# Patient Record
Sex: MALE | Race: WHITE | HISPANIC OR LATINO | URBAN - METROPOLITAN AREA
[De-identification: names, ages, dates, MRNs, and addresses within clinical notes are randomized per-mention and may not be internally consistent; named-entity substitution may affect disease eponyms.]

---

## 2020-01-29 ENCOUNTER — OUTPATIENT (OUTPATIENT)
Dept: OUTPATIENT SERVICES | Facility: HOSPITAL | Age: 65
LOS: 1 days | End: 2020-01-29
Payer: MEDICAID

## 2020-01-29 VITALS
WEIGHT: 190.04 LBS | SYSTOLIC BLOOD PRESSURE: 148 MMHG | RESPIRATION RATE: 18 BRPM | DIASTOLIC BLOOD PRESSURE: 90 MMHG | TEMPERATURE: 97 F | HEART RATE: 95 BPM

## 2020-01-29 DIAGNOSIS — Z98.890 OTHER SPECIFIED POSTPROCEDURAL STATES: Chronic | ICD-10-CM

## 2020-01-29 DIAGNOSIS — K46.9 UNSPECIFIED ABDOMINAL HERNIA WITHOUT OBSTRUCTION OR GANGRENE: ICD-10-CM

## 2020-01-29 DIAGNOSIS — Z01.818 ENCOUNTER FOR OTHER PREPROCEDURAL EXAMINATION: ICD-10-CM

## 2020-01-29 DIAGNOSIS — Z29.9 ENCOUNTER FOR PROPHYLACTIC MEASURES, UNSPECIFIED: ICD-10-CM

## 2020-01-29 DIAGNOSIS — I10 ESSENTIAL (PRIMARY) HYPERTENSION: ICD-10-CM

## 2020-01-29 LAB
ANION GAP SERPL CALC-SCNC: 12 MMOL/L — SIGNIFICANT CHANGE UP (ref 5–17)
BASOPHILS # BLD AUTO: 0.02 K/UL — SIGNIFICANT CHANGE UP (ref 0–0.2)
BASOPHILS NFR BLD AUTO: 0.3 % — SIGNIFICANT CHANGE UP (ref 0–2)
BUN SERPL-MCNC: 13 MG/DL — SIGNIFICANT CHANGE UP (ref 8–20)
CALCIUM SERPL-MCNC: 9.9 MG/DL — SIGNIFICANT CHANGE UP (ref 8.6–10.2)
CHLORIDE SERPL-SCNC: 102 MMOL/L — SIGNIFICANT CHANGE UP (ref 98–107)
CO2 SERPL-SCNC: 24 MMOL/L — SIGNIFICANT CHANGE UP (ref 22–29)
CREAT SERPL-MCNC: 0.87 MG/DL — SIGNIFICANT CHANGE UP (ref 0.5–1.3)
EOSINOPHIL # BLD AUTO: 0.18 K/UL — SIGNIFICANT CHANGE UP (ref 0–0.5)
EOSINOPHIL NFR BLD AUTO: 2.4 % — SIGNIFICANT CHANGE UP (ref 0–6)
GLUCOSE SERPL-MCNC: 135 MG/DL — HIGH (ref 70–99)
HCT VFR BLD CALC: 49.6 % — SIGNIFICANT CHANGE UP (ref 39–50)
HGB BLD-MCNC: 16.6 G/DL — SIGNIFICANT CHANGE UP (ref 13–17)
IMM GRANULOCYTES NFR BLD AUTO: 0.4 % — SIGNIFICANT CHANGE UP (ref 0–1.5)
LYMPHOCYTES # BLD AUTO: 2.4 K/UL — SIGNIFICANT CHANGE UP (ref 1–3.3)
LYMPHOCYTES # BLD AUTO: 32.3 % — SIGNIFICANT CHANGE UP (ref 13–44)
MCHC RBC-ENTMCNC: 29.6 PG — SIGNIFICANT CHANGE UP (ref 27–34)
MCHC RBC-ENTMCNC: 33.5 GM/DL — SIGNIFICANT CHANGE UP (ref 32–36)
MCV RBC AUTO: 88.6 FL — SIGNIFICANT CHANGE UP (ref 80–100)
MONOCYTES # BLD AUTO: 0.85 K/UL — SIGNIFICANT CHANGE UP (ref 0–0.9)
MONOCYTES NFR BLD AUTO: 11.4 % — SIGNIFICANT CHANGE UP (ref 2–14)
MRSA PCR RESULT.: SIGNIFICANT CHANGE UP
NEUTROPHILS # BLD AUTO: 3.96 K/UL — SIGNIFICANT CHANGE UP (ref 1.8–7.4)
NEUTROPHILS NFR BLD AUTO: 53.2 % — SIGNIFICANT CHANGE UP (ref 43–77)
PLATELET # BLD AUTO: 253 K/UL — SIGNIFICANT CHANGE UP (ref 150–400)
POTASSIUM SERPL-MCNC: 4 MMOL/L — SIGNIFICANT CHANGE UP (ref 3.5–5.3)
POTASSIUM SERPL-SCNC: 4 MMOL/L — SIGNIFICANT CHANGE UP (ref 3.5–5.3)
RBC # BLD: 5.6 M/UL — SIGNIFICANT CHANGE UP (ref 4.2–5.8)
RBC # FLD: 12.6 % — SIGNIFICANT CHANGE UP (ref 10.3–14.5)
S AUREUS DNA NOSE QL NAA+PROBE: SIGNIFICANT CHANGE UP
SODIUM SERPL-SCNC: 138 MMOL/L — SIGNIFICANT CHANGE UP (ref 135–145)
WBC # BLD: 7.44 K/UL — SIGNIFICANT CHANGE UP (ref 3.8–10.5)
WBC # FLD AUTO: 7.44 K/UL — SIGNIFICANT CHANGE UP (ref 3.8–10.5)

## 2020-01-29 PROCEDURE — 93005 ELECTROCARDIOGRAM TRACING: CPT

## 2020-01-29 PROCEDURE — 36415 COLL VENOUS BLD VENIPUNCTURE: CPT

## 2020-01-29 PROCEDURE — 85027 COMPLETE CBC AUTOMATED: CPT

## 2020-01-29 PROCEDURE — 80048 BASIC METABOLIC PNL TOTAL CA: CPT

## 2020-01-29 PROCEDURE — 93010 ELECTROCARDIOGRAM REPORT: CPT

## 2020-01-29 PROCEDURE — G0463: CPT

## 2020-01-29 PROCEDURE — 87640 STAPH A DNA AMP PROBE: CPT

## 2020-01-29 PROCEDURE — 87641 MR-STAPH DNA AMP PROBE: CPT

## 2020-01-29 RX ORDER — CEFAZOLIN SODIUM 1 G
2000 VIAL (EA) INJECTION ONCE
Refills: 0 | Status: DISCONTINUED | OUTPATIENT
Start: 2020-02-06 | End: 2020-02-22

## 2020-01-29 NOTE — H&P PST ADULT - ENDOCRINE COMMENTS
----- Message from Naz Kaiser NP sent at 8/17/2017  9:03 AM CDT -----  Please call pt and tell her......  Patient's AFFIRM swab was (+) for BV and (+) for Candida (Yeast Infection).    Please let her know rx was sent for an antibiotic to treat the BV.  Remind her that she cannot drink while taking medication or for 3 days after completion of medication.    I will also sent prescriptions for Diflucan.                   hypothyroid

## 2020-01-29 NOTE — H&P PST ADULT - HISTORY OF PRESENT ILLNESS
64 yr old male presents with a right inguinal hernia that has been there for years with no c/o . Pt had left inguinal hernia repair in 2001 without complications . Over past few months c/o mild discomfort to right groin ,no pain meds , denies pain today . Pt  is now scheduled for surgery  with DR. Jaeger.

## 2020-01-29 NOTE — H&P PST ADULT - ASSESSMENT
Pleasant 64 yr old male in NAD with history of htn and inguinal hernia .  Pt is scheduled for  right inguinal hernia repair with Dr. Jaeger .   OPIOID RISK TOOL    BENJI EACH BOX THAT APPLIES AND ADD TOTALS AT THE END    FAMILY HISTORY OF SUBSTANCE ABUSE                 FEMALE         MALE                                                Alcohol                             [  ]1 pt          [  ]3pts                                               Illegal Durgs                     [  ]2 pts        [  ]3pts                                               Rx Drugs                           [  ]4 pts        [  ]4 pts    PERSONAL HISTORY OF SUBSTANCE ABUSE                                                                                          Alcohol                             [  ]3 pts       [  ]3 pts                                               Illegal Durgs                     [  ]4 pts        [  ]4 pts                                               Rx Drugs                           [  ]5 pts        [  ]5 pts    AGE BETWEEN 16-45 YEARS                                      [  ]1 pt         [  ]1 pt    HISTORY OF PREADOLESCENT   SEXUAL ABUSE                                                             [  ]3 pts        [  ]0pts    PSYCHOLOGICAL DISEASE                     ADD, OCD, Bipolar, Schizophrenia        [  ]2 pts         [  ]2 pts                      Depression                                               [  ]1 pt           [  ]1 pt           SCORING TOTAL   (add numbers and type here)              (*0**)                                     A score of 3 or lower indicated LOW risk for future opiod abuse  A score of 4 to 7 indicated moderate risk for future opiod abuse  A score of 8 or higher indicates a high risk for opiod abuse  CAPRINI VTE 2.0 SCORE [CLOT updated 2019]    AGE RELATED RISK FACTORS                                                       MOBILITY RELATED FACTORS  [ ] Age 41-60 years                                            (1 Point)                    [ ] Bed rest                                                        (1 Point)  [X ] Age: 61-74 years                                           (2 Points)                  [ ] Plaster cast                                                   (2 Points)  [ ] Age= 75 years                                              (3 Points)                    [ ] Bed bound for more than 72 hours                 (2 Points)    DISEASE RELATED RISK FACTORS                                               GENDER SPECIFIC FACTORS  [ ] Edema in the lower extremities                       (1 Point)              [ ] Pregnancy                                                     (1 Point)  [ ] Varicose veins                                               (1 Point)                     [ ] Post-partum < 6 weeks                                   (1 Point)             [ ] BMI > 25 Kg/m2                                            (1 Point)                     [ ] Hormonal therapy  or oral contraception          (1 Point)                 [ ] Sepsis (in the previous month)                        (1 Point)               [ ] History of pregnancy complications                 (1 point)  [ ] Pneumonia or serious lung disease                                               [ ] Unexplained or recurrent                     (1 Point)           (in the previous month)                               (1 Point)  [ ] Abnormal pulmonary function test                     (1 Point)                 SURGERY RELATED RISK FACTORS  [ ] Acute myocardial infarction                              (1 Point)               [ ]  Section                                             (1 Point)  [ ] Congestive heart failure (in the previous month)  (1 Point)      [ ] Minor surgery                                                  (1 Point)   [ ] Inflammatory bowel disease                             (1 Point)               [ ] Arthroscopic surgery                                        (2 Points)  [ ] Central venous access                                      (2 Points)                [ x] General surgery lasting more than 45 minutes (2 points)  [ ] Malignancy- Present or previous                   (2 Points)                [ ] Elective arthroplasty                                         (5 points)    [ ] Stroke (in the previous month)                          (5 Points)                                                                                                                                                           HEMATOLOGY RELATED FACTORS                                                 TRAUMA RELATED RISK FACTORS  [ ] Prior episodes of VTE                                     (3 Points)                [ ] Fracture of the hip, pelvis, or leg                       (5 Points)  [ ] Positive family history for VTE                         (3 Points)             [ ] Acute spinal cord injury (in the previous month)  (5 Points)  [ ] Prothrombin 45978 A                                     (3 Points)               [ ] Paralysis  (less than 1 month)                             (5 Points)  [ ] Factor V Leiden                                             (3 Points)                  [ ] Multiple Trauma within 1 month                        (5 Points)  [ ] Lupus anticoagulants                                     (3 Points)                                                           [ ] Anticardiolipin antibodies                               (3 Points)                                                       [ ] High homocysteine in the blood                      (3 Points)                                             [ ] Other congenital or acquired thrombophilia      (3 Points)                                                [ ] Heparin induced thrombocytopenia                  (3 Points)                                     Total Score [     4     ]

## 2020-01-29 NOTE — ASU PATIENT PROFILE, ADULT - LEARNING ASSESSMENT (PATIENT) ADDITIONAL COMMENTS
NP, instructed pt on pre-op instructions/teaching, tips for safer surgery, pre-surgical infection prevention instructions, pain management scale, MRSA/MSSA instructions given.

## 2020-01-29 NOTE — H&P PST ADULT - NSICDXPROBLEM_GEN_ALL_CORE_FT
PROBLEM DIAGNOSES  Problem: HTN (hypertension)  Assessment and Plan: medical clearance Dr. Marlon Wade     Problem: Hernia  Assessment and Plan: repair of right inguinal hernia with Dr. Jaeger     Problem: Need for prophylactic measure  Assessment and Plan: caprini score 4

## 2020-01-29 NOTE — H&P PST ADULT - NSICDXPASTMEDICALHX_GEN_ALL_CORE_FT
PAST MEDICAL HISTORY:  Hernia right inguinal    High cholesterol     HTN (hypertension)     Hypothyroidism

## 2020-01-29 NOTE — H&P PST ADULT - NSANTHOSAYNRD_GEN_A_CORE
No. ROLAN screening performed.  STOP BANG Legend: 0-2 = LOW Risk; 3-4 = INTERMEDIATE Risk; 5-8 = HIGH Risk

## 2020-02-05 ENCOUNTER — TRANSCRIPTION ENCOUNTER (OUTPATIENT)
Age: 65
End: 2020-02-05

## 2020-02-06 ENCOUNTER — OUTPATIENT (OUTPATIENT)
Dept: OUTPATIENT SERVICES | Facility: HOSPITAL | Age: 65
LOS: 1 days | End: 2020-02-06
Payer: MEDICAID

## 2020-02-06 VITALS
SYSTOLIC BLOOD PRESSURE: 154 MMHG | TEMPERATURE: 99 F | HEART RATE: 84 BPM | HEIGHT: 68.5 IN | OXYGEN SATURATION: 100 % | WEIGHT: 190.04 LBS | RESPIRATION RATE: 16 BRPM | DIASTOLIC BLOOD PRESSURE: 78 MMHG

## 2020-02-06 VITALS
RESPIRATION RATE: 15 BRPM | OXYGEN SATURATION: 100 % | DIASTOLIC BLOOD PRESSURE: 89 MMHG | SYSTOLIC BLOOD PRESSURE: 138 MMHG | HEART RATE: 63 BPM

## 2020-02-06 DIAGNOSIS — K40.90 UNILATERAL INGUINAL HERNIA, WITHOUT OBSTRUCTION OR GANGRENE, NOT SPECIFIED AS RECURRENT: ICD-10-CM

## 2020-02-06 DIAGNOSIS — Z98.890 OTHER SPECIFIED POSTPROCEDURAL STATES: Chronic | ICD-10-CM

## 2020-02-06 PROCEDURE — 88302 TISSUE EXAM BY PATHOLOGIST: CPT

## 2020-02-06 PROCEDURE — C1781: CPT

## 2020-02-06 PROCEDURE — 88302 TISSUE EXAM BY PATHOLOGIST: CPT | Mod: 26

## 2020-02-06 PROCEDURE — 49505 PRP I/HERN INIT REDUC >5 YR: CPT | Mod: RT

## 2020-02-06 RX ORDER — DEXAMETHASONE 0.5 MG/5ML
8 ELIXIR ORAL ONCE
Refills: 0 | Status: DISCONTINUED | OUTPATIENT
Start: 2020-02-06 | End: 2020-02-06

## 2020-02-06 RX ORDER — SIMVASTATIN 20 MG/1
1 TABLET, FILM COATED ORAL
Qty: 0 | Refills: 0 | DISCHARGE

## 2020-02-06 RX ORDER — LEVOTHYROXINE SODIUM 125 MCG
1 TABLET ORAL
Qty: 0 | Refills: 0 | DISCHARGE

## 2020-02-06 RX ORDER — ONDANSETRON 8 MG/1
4 TABLET, FILM COATED ORAL ONCE
Refills: 0 | Status: COMPLETED | OUTPATIENT
Start: 2020-02-06 | End: 2020-02-06

## 2020-02-06 RX ORDER — FENTANYL CITRATE 50 UG/ML
25 INJECTION INTRAVENOUS
Refills: 0 | Status: DISCONTINUED | OUTPATIENT
Start: 2020-02-06 | End: 2020-02-06

## 2020-02-06 RX ORDER — HYDROMORPHONE HYDROCHLORIDE 2 MG/ML
1 INJECTION INTRAMUSCULAR; INTRAVENOUS; SUBCUTANEOUS
Refills: 0 | Status: DISCONTINUED | OUTPATIENT
Start: 2020-02-06 | End: 2020-02-06

## 2020-02-06 RX ORDER — OMEGA-3 ACID ETHYL ESTERS 1 G
1 CAPSULE ORAL
Qty: 0 | Refills: 0 | DISCHARGE

## 2020-02-06 RX ORDER — LISINOPRIL 2.5 MG/1
1 TABLET ORAL
Qty: 0 | Refills: 0 | DISCHARGE

## 2020-02-06 RX ORDER — VITAMIN E 100 UNIT
1 CAPSULE ORAL
Qty: 0 | Refills: 0 | DISCHARGE

## 2020-02-06 RX ORDER — BUPIVACAINE 13.3 MG/ML
20 INJECTION, SUSPENSION, LIPOSOMAL INFILTRATION ONCE
Refills: 0 | Status: DISCONTINUED | OUTPATIENT
Start: 2020-02-06 | End: 2020-02-06

## 2020-02-06 RX ORDER — SODIUM CHLORIDE 9 MG/ML
1000 INJECTION, SOLUTION INTRAVENOUS
Refills: 0 | Status: DISCONTINUED | OUTPATIENT
Start: 2020-02-06 | End: 2020-02-06

## 2020-02-06 RX ADMIN — ONDANSETRON 4 MILLIGRAM(S): 8 TABLET, FILM COATED ORAL at 12:30

## 2020-02-06 RX ADMIN — HYDROMORPHONE HYDROCHLORIDE 1 MILLIGRAM(S): 2 INJECTION INTRAMUSCULAR; INTRAVENOUS; SUBCUTANEOUS at 12:30

## 2020-02-06 NOTE — BRIEF OPERATIVE NOTE - NSICDXBRIEFPROCEDURE_GEN_ALL_CORE_FT
PROCEDURES:  Repair, hernia, inguinal, open, adult 06-Feb-2020 11:21:50 R Direct inguinal hernia repair with [facility] mesh Nader Henderson

## 2020-02-06 NOTE — ASU DISCHARGE PLAN (ADULT/PEDIATRIC) - CARE PROVIDER_API CALL
Alpesh Jaeger)  Surgery  [address] [place] [address]  Phone: [phone]  Fax: [phone]  Established Patient  Follow Up Time: 2 weeks

## 2020-02-06 NOTE — ASU DISCHARGE PLAN (ADULT/PEDIATRIC) - CALL YOUR DOCTOR IF YOU HAVE ANY OF THE FOLLOWING:
Inability to tolerate liquids or foods/Swelling that gets worse/Fever greater than (need to indicate Fahrenheit or Celsius)/Wound/Surgical Site with redness, or foul smelling discharge or pus/Pain not relieved by Medications/Bleeding that does not stop

## 2020-02-06 NOTE — BRIEF OPERATIVE NOTE - OPERATION/FINDINGS
Right direct inguinal hernia repair with UHS mesh and excision of lipoma from inguinal canal.  Wound class 1

## 2020-02-06 NOTE — ASU DISCHARGE PLAN (ADULT/PEDIATRIC) - ASU DC SPECIAL INSTRUCTIONSFT
- Please follow up in Clinic with Dr. Jaeger in 2 weeks  - Okay to resume regular diet  - Okay to shower normally with soap and water. Do not soak incisions  - For pain, please take tylenol and motrin as instructed on packaging. If Pain does not subside with tylenol and motrin, please take percocet 5/325 as prescribed for severe pain  - Please use Ice packs as needed for pain  - No heavy lifting >15-20lbs for 4-6 weeks

## 2020-02-13 LAB — SURGICAL PATHOLOGY STUDY: SIGNIFICANT CHANGE UP

## 2022-12-09 ENCOUNTER — RX ONLY (RX ONLY)
Age: 67
End: 2022-12-09

## 2022-12-09 ENCOUNTER — OFFICE (OUTPATIENT)
Dept: URBAN - METROPOLITAN AREA CLINIC 94 | Facility: CLINIC | Age: 67
Setting detail: OPHTHALMOLOGY
End: 2022-12-09
Payer: COMMERCIAL

## 2022-12-09 DIAGNOSIS — Z96.1: ICD-10-CM

## 2022-12-09 DIAGNOSIS — H35.373: ICD-10-CM

## 2022-12-09 DIAGNOSIS — H16.223: ICD-10-CM

## 2022-12-09 DIAGNOSIS — H26.492: ICD-10-CM

## 2022-12-09 PROCEDURE — 99213 OFFICE O/P EST LOW 20 MIN: CPT | Performed by: PHYSICIAN ASSISTANT

## 2022-12-09 ASSESSMENT — REFRACTION_MANIFEST
OS_SPHERE: +1.00
OS_VA2: 20/20
OD_VA1: 20/20
OS_AXIS: 115
OD_ADD: +1.50
OD_VA2: 20/20
OS_SPHERE: +0.25
OS_VA2: 20/30+2
OD_AXIS: 060
OD_AXIS: 075
OD_SPHERE: -0.25
OD_CYLINDER: -0.50
OD_SPHERE: +2.25
OS_CYLINDER: SPH
OD_ADD: +2.50
OS_SPHERE: +0.25
OS_ADD: +2.00
OD_CYLINDER: -1.00
OS_CYLINDER: -0.25
OS_ADD: +2.25
OS_VA1: 20/20
OD_AXIS: 070
OD_VA1: 20/40+2
OD_VA2: 20/20
OD_SPHERE: PLANO
OD_VA1: 20/30
OD_CYLINDER: -0.50
OS_VA1: 20/20
OD_CYLINDER: -0.75
OS_CYLINDER: -0.25
OS_AXIS: 120
OD_VA1: 20/20
OS_ADD: +2.50
OD_SPHERE: PLANO
OS_VA1: 20/30
OD_AXIS: 175
OD_ADD: +2.25

## 2022-12-09 ASSESSMENT — REFRACTION_CURRENTRX
OS_AXIS: 154
OD_ADD: +2.25
OS_SPHERE: +1.25
OD_AXIS: 050
OD_OVR_VA: 20/
OS_OVR_VA: 20/
OS_OVR_VA: 20/
OD_OVR_VA: 20/
OD_VPRISM_DIRECTION: BF
OD_VPRISM_DIRECTION: PROGS
OD_CYLINDER: -1.00
OD_CYLINDER: -1.00
OD_ADD: +2.50
OS_ADD: +2.00
OS_SPHERE: +0.75
OD_OVR_VA: 20/
OS_ADD: +2.25
OD_VPRISM_DIRECTION: PROGS
OS_OVR_VA: 20/
OS_VPRISM_DIRECTION: PROGS
OD_SPHERE: +2.75
OS_VPRISM_DIRECTION: PROGS
OS_AXIS: 166
OD_SPHERE: +0.75
OS_CYLINDER: -0.50
OS_VPRISM_DIRECTION: BF
OS_SPHERE: +1.50
OD_AXIS: 047
OS_CYLINDER: -0.25
OS_ADD: +2.50
OD_SPHERE: +2.00
OD_ADD: +2.00

## 2022-12-09 ASSESSMENT — SUPERFICIAL PUNCTATE KERATITIS (SPK)
OS_SPK: T
OD_SPK: T

## 2022-12-09 ASSESSMENT — SPHEQUIV_DERIVED
OD_SPHEQUIV: -0.5
OS_SPHEQUIV: 0.125
OD_SPHEQUIV: 2
OS_SPHEQUIV: 0.25
OS_SPHEQUIV: 0.125
OD_SPHEQUIV: -0.25

## 2022-12-09 ASSESSMENT — KERATOMETRY
OS_AXISANGLE_DEGREES: 070
OS_K2POWER_DIOPTERS: 43.00
METHOD_AUTO_MANUAL: AUTO
OS_K1POWER_DIOPTERS: 42.50
OD_K2POWER_DIOPTERS: 43.00
OD_K1POWER_DIOPTERS: 42.50
OD_AXISANGLE_DEGREES: 147

## 2022-12-09 ASSESSMENT — LID POSITION - PTOSIS
OS_PTOSIS: LUL T
OD_PTOSIS: RUL T

## 2022-12-09 ASSESSMENT — TONOMETRY
OS_IOP_MMHG: 16
OD_IOP_MMHG: 15

## 2022-12-09 ASSESSMENT — AXIALLENGTH_DERIVED
OD_AL: 23.9708
OD_AL: 23.0987
OS_AL: 23.7713
OS_AL: 23.8209
OS_AL: 23.8209
OD_AL: 24.0718

## 2022-12-09 ASSESSMENT — REFRACTION_AUTOREFRACTION
OD_CYLINDER: -1.00
OS_SPHERE: +0.50
OS_CYLINDER: -0.50
OS_AXIS: 116
OD_AXIS: 074
OD_SPHERE: +0.25

## 2022-12-09 ASSESSMENT — CONFRONTATIONAL VISUAL FIELD TEST (CVF)
OD_FINDINGS: FULL
OS_FINDINGS: FULL

## 2022-12-09 ASSESSMENT — TEAR BREAK UP TIME (TBUT)
OS_TBUT: T 1+
OD_TBUT: T 1+

## 2022-12-09 ASSESSMENT — VISUAL ACUITY
OS_BCVA: 20/30
OD_BCVA: 20/20

## 2023-01-13 ENCOUNTER — OFFICE (OUTPATIENT)
Dept: URBAN - METROPOLITAN AREA CLINIC 94 | Facility: CLINIC | Age: 68
Setting detail: OPHTHALMOLOGY
End: 2023-01-13
Payer: COMMERCIAL

## 2023-01-13 DIAGNOSIS — H35.373: ICD-10-CM

## 2023-01-13 DIAGNOSIS — H16.223: ICD-10-CM

## 2023-01-13 DIAGNOSIS — H26.492: ICD-10-CM

## 2023-01-13 PROCEDURE — 92012 INTRM OPH EXAM EST PATIENT: CPT | Performed by: OPHTHALMOLOGY

## 2023-01-13 ASSESSMENT — REFRACTION_MANIFEST
OS_ADD: +2.50
OD_AXIS: 175
OD_ADD: +2.25
OS_CYLINDER: -0.25
OD_SPHERE: PLANO
OD_CYLINDER: -0.50
OD_VA1: 20/30
OD_VA1: 20/20
OD_ADD: +2.50
OD_SPHERE: PLANO
OD_AXIS: 070
OS_SPHERE: +0.25
OD_CYLINDER: -0.75
OS_CYLINDER: -0.25
OD_SPHERE: -0.25
OS_SPHERE: +0.25
OD_VA2: 20/20
OD_AXIS: 075
OS_SPHERE: +1.00
OS_VA1: 20/20
OD_VA1: 20/20
OS_VA1: 20/30
OD_ADD: +1.50
OS_VA2: 20/30+2
OD_VA1: 20/40+2
OS_VA2: 20/20
OD_CYLINDER: -1.00
OS_AXIS: 120
OS_CYLINDER: SPH
OS_VA1: 20/20
OS_ADD: +2.25
OD_VA2: 20/20
OD_SPHERE: +2.25
OS_AXIS: 115
OS_ADD: +2.00
OD_AXIS: 060
OD_CYLINDER: -0.50

## 2023-01-13 ASSESSMENT — REFRACTION_CURRENTRX
OS_OVR_VA: 20/
OD_ADD: +2.25
OS_VPRISM_DIRECTION: BF
OS_OVR_VA: 20/
OD_OVR_VA: 20/
OS_ADD: +2.50
OD_SPHERE: +2.75
OS_AXIS: 154
OS_CYLINDER: -0.50
OD_ADD: +2.00
OS_SPHERE: +1.50
OD_AXIS: 050
OS_ADD: +2.25
OS_CYLINDER: -0.25
OD_CYLINDER: -1.00
OD_OVR_VA: 20/
OS_VPRISM_DIRECTION: PROGS
OS_SPHERE: +0.75
OD_VPRISM_DIRECTION: PROGS
OS_ADD: +2.00
OS_AXIS: 166
OD_CYLINDER: -1.00
OD_VPRISM_DIRECTION: BF
OD_OVR_VA: 20/
OD_SPHERE: +2.00
OS_VPRISM_DIRECTION: PROGS
OS_SPHERE: +1.25
OS_OVR_VA: 20/
OD_VPRISM_DIRECTION: PROGS
OD_AXIS: 047
OD_ADD: +2.50
OD_SPHERE: +0.75

## 2023-01-13 ASSESSMENT — AXIALLENGTH_DERIVED
OS_AL: 23.8706
OS_AL: 23.8209
OD_AL: 23.0548
OS_AL: 23.8209
OD_AL: 23.8735
OD_AL: 24.024

## 2023-01-13 ASSESSMENT — KERATOMETRY
METHOD_AUTO_MANUAL: AUTO
OD_K1POWER_DIOPTERS: 42.50
OD_K2POWER_DIOPTERS: 43.25
OS_K2POWER_DIOPTERS: 43.00
OS_K1POWER_DIOPTERS: 42.50
OD_AXISANGLE_DEGREES: 156
OS_AXISANGLE_DEGREES: 054

## 2023-01-13 ASSESSMENT — LID POSITION - PTOSIS
OS_PTOSIS: LUL T
OD_PTOSIS: RUL T

## 2023-01-13 ASSESSMENT — SPHEQUIV_DERIVED
OS_SPHEQUIV: 0
OD_SPHEQUIV: -0.5
OS_SPHEQUIV: 0.125
OD_SPHEQUIV: -0.125
OS_SPHEQUIV: 0.125
OD_SPHEQUIV: 2

## 2023-01-13 ASSESSMENT — VISUAL ACUITY
OD_BCVA: 20/30+
OS_BCVA: 20/30-

## 2023-01-13 ASSESSMENT — CONFRONTATIONAL VISUAL FIELD TEST (CVF)
OD_FINDINGS: FULL
OS_FINDINGS: FULL

## 2023-01-13 ASSESSMENT — TEAR BREAK UP TIME (TBUT)
OD_TBUT: T 1+
OS_TBUT: T 1+

## 2023-01-13 ASSESSMENT — TONOMETRY
OS_IOP_MMHG: 16
OD_IOP_MMHG: 18

## 2023-01-13 ASSESSMENT — REFRACTION_AUTOREFRACTION
OS_AXIS: 107
OD_CYLINDER: -1.25
OS_SPHERE: +0.25
OS_CYLINDER: -0.50
OD_AXIS: 080
OD_SPHERE: +0.50

## 2023-01-13 ASSESSMENT — SUPERFICIAL PUNCTATE KERATITIS (SPK)
OD_SPK: T
OS_SPK: T

## 2023-01-31 PROBLEM — I10 ESSENTIAL (PRIMARY) HYPERTENSION: Chronic | Status: ACTIVE | Noted: 2020-01-29

## 2023-01-31 PROBLEM — E78.00 PURE HYPERCHOLESTEROLEMIA, UNSPECIFIED: Chronic | Status: ACTIVE | Noted: 2020-01-29

## 2023-01-31 PROBLEM — E03.9 HYPOTHYROIDISM, UNSPECIFIED: Chronic | Status: ACTIVE | Noted: 2020-01-29

## 2023-01-31 PROBLEM — K46.9 UNSPECIFIED ABDOMINAL HERNIA WITHOUT OBSTRUCTION OR GANGRENE: Chronic | Status: ACTIVE | Noted: 2020-01-29

## 2023-02-17 ENCOUNTER — ASC (OUTPATIENT)
Dept: URBAN - METROPOLITAN AREA SURGERY 8 | Facility: SURGERY | Age: 68
Setting detail: OPHTHALMOLOGY
End: 2023-02-17
Payer: COMMERCIAL

## 2023-02-17 DIAGNOSIS — H26.492: ICD-10-CM

## 2023-02-17 PROCEDURE — 66821 AFTER CATARACT LASER SURGERY: CPT | Performed by: OPHTHALMOLOGY

## 2023-02-17 ASSESSMENT — REFRACTION_MANIFEST
OD_CYLINDER: -1.00
OD_AXIS: 075
OD_VA1: 20/20
OD_SPHERE: PLANO
OD_SPHERE: PLANO
OS_SPHERE: +0.25
OD_ADD: +2.50
OD_SPHERE: -0.25
OD_CYLINDER: -0.75
OS_CYLINDER: -0.25
OD_ADD: +1.50
OD_VA1: 20/20
OS_VA1: 20/20
OS_ADD: +2.00
OD_VA1: 20/30
OD_ADD: +2.25
OS_CYLINDER: -0.25
OD_AXIS: 060
OD_CYLINDER: -0.50
OD_VA2: 20/20
OS_ADD: +2.25
OD_CYLINDER: -0.50
OD_AXIS: 070
OD_AXIS: 175
OS_AXIS: 120
OS_VA2: 20/30+2
OD_SPHERE: +2.25
OS_AXIS: 115
OS_VA1: 20/30
OS_VA1: 20/20
OS_SPHERE: +1.00
OD_VA2: 20/20
OS_VA2: 20/20
OS_SPHERE: +0.25
OD_VA1: 20/40+2
OS_CYLINDER: SPH
OS_ADD: +2.50

## 2023-02-17 ASSESSMENT — REFRACTION_CURRENTRX
OS_SPHERE: +1.50
OS_OVR_VA: 20/
OS_CYLINDER: -0.50
OS_AXIS: 166
OS_ADD: +2.50
OD_OVR_VA: 20/
OD_VPRISM_DIRECTION: PROGS
OS_AXIS: 154
OD_AXIS: 047
OD_VPRISM_DIRECTION: BF
OS_ADD: +2.00
OD_OVR_VA: 20/
OD_AXIS: 050
OS_VPRISM_DIRECTION: PROGS
OS_ADD: +2.25
OS_VPRISM_DIRECTION: PROGS
OD_CYLINDER: -1.00
OS_VPRISM_DIRECTION: BF
OS_OVR_VA: 20/
OD_ADD: +2.25
OS_SPHERE: +1.25
OD_CYLINDER: -1.00
OS_OVR_VA: 20/
OD_ADD: +2.50
OS_SPHERE: +0.75
OD_SPHERE: +2.75
OD_OVR_VA: 20/
OD_VPRISM_DIRECTION: PROGS
OD_SPHERE: +0.75
OD_SPHERE: +2.00
OS_CYLINDER: -0.25
OD_ADD: +2.00

## 2023-02-17 ASSESSMENT — REFRACTION_AUTOREFRACTION
OD_CYLINDER: -1.25
OS_AXIS: 107
OS_CYLINDER: -0.50
OS_SPHERE: +0.25
OD_AXIS: 080
OD_SPHERE: +0.50

## 2023-02-17 ASSESSMENT — KERATOMETRY
OS_K1POWER_DIOPTERS: 42.50
OD_AXISANGLE_DEGREES: 156
OS_K2POWER_DIOPTERS: 43.00
OD_K1POWER_DIOPTERS: 42.50
METHOD_AUTO_MANUAL: AUTO
OS_AXISANGLE_DEGREES: 054
OD_K2POWER_DIOPTERS: 43.25

## 2023-02-17 ASSESSMENT — VISUAL ACUITY
OD_BCVA: 20/25
OS_BCVA: 20/30-1

## 2023-02-17 ASSESSMENT — AXIALLENGTH_DERIVED
OD_AL: 23.0548
OS_AL: 23.8209
OD_AL: 24.024
OS_AL: 23.8209
OD_AL: 23.8735
OS_AL: 23.8706

## 2023-02-17 ASSESSMENT — SPHEQUIV_DERIVED
OD_SPHEQUIV: -0.5
OS_SPHEQUIV: 0.125
OS_SPHEQUIV: 0
OS_SPHEQUIV: 0.125
OD_SPHEQUIV: -0.125
OD_SPHEQUIV: 2

## 2023-02-17 ASSESSMENT — TONOMETRY
OS_IOP_MMHG: 16
OD_IOP_MMHG: 12

## 2023-03-05 ENCOUNTER — OFFICE (OUTPATIENT)
Dept: URBAN - METROPOLITAN AREA CLINIC 94 | Facility: CLINIC | Age: 68
Setting detail: OPHTHALMOLOGY
End: 2023-03-05
Payer: COMMERCIAL

## 2023-03-05 DIAGNOSIS — H26.492: ICD-10-CM

## 2023-03-05 PROCEDURE — 99024 POSTOP FOLLOW-UP VISIT: CPT | Performed by: REGISTERED NURSE

## 2023-03-05 ASSESSMENT — REFRACTION_MANIFEST
OS_CYLINDER: -0.25
OD_CYLINDER: -1.00
OD_ADD: +2.50
OD_SPHERE: PLANO
OD_VA1: 20/20
OS_VA1: 20/30
OD_VA2: 20/20
OS_ADD: +2.50
OS_CYLINDER: -0.25
OD_SPHERE: PLANO
OS_SPHERE: +0.25
OD_AXIS: 060
OS_ADD: +2.25
OS_VA1: 20/20
OD_VA1: 20/40+2
OD_CYLINDER: -0.50
OD_ADD: +2.25
OD_AXIS: 175
OD_SPHERE: PLANO
OD_VA1: 20/20
OD_CYLINDER: -0.75
OD_CYLINDER: -0.50
OS_SPHERE: +0.25
OD_VA2: 20/20
OD_VA1: 20/25
OD_AXIS: 080
OS_AXIS: 115
OS_VA1: 20/20
OS_AXIS: 120
OS_VA2: 20/20
OD_CYLINDER: -1.00
OS_VA2: 20/30+2
OS_ADD: +2.00
OD_SPHERE: -0.25
OD_SPHERE: +2.25
OD_AXIS: 070
OD_VA1: 20/30
OS_CYLINDER: SPH
OD_AXIS: 075
OD_ADD: +1.50
OS_SPHERE: +1.00

## 2023-03-05 ASSESSMENT — REFRACTION_CURRENTRX
OS_OVR_VA: 20/
OD_CYLINDER: -1.00
OS_VPRISM_DIRECTION: PROGS
OD_ADD: +2.50
OS_ADD: +2.00
OD_SPHERE: +2.75
OD_VPRISM_DIRECTION: BF
OS_SPHERE: +1.25
OS_VPRISM_DIRECTION: BF
OD_ADD: +2.25
OD_CYLINDER: -1.00
OS_SPHERE: +0.75
OS_AXIS: 154
OD_SPHERE: +2.00
OS_OVR_VA: 20/
OS_CYLINDER: -0.50
OD_OVR_VA: 20/
OS_AXIS: 166
OD_AXIS: 047
OS_ADD: +2.25
OD_AXIS: 050
OS_OVR_VA: 20/
OD_VPRISM_DIRECTION: PROGS
OD_SPHERE: +0.75
OD_OVR_VA: 20/
OS_VPRISM_DIRECTION: PROGS
OS_CYLINDER: -0.25
OS_ADD: +2.50
OD_ADD: +2.00
OD_VPRISM_DIRECTION: PROGS
OS_SPHERE: +1.50
OD_OVR_VA: 20/

## 2023-03-05 ASSESSMENT — AXIALLENGTH_DERIVED
OS_AL: 23.8237
OD_AL: 23.0548
OD_AL: 23.9235
OS_AL: 23.7742
OD_AL: 24.024
OS_AL: 23.7742

## 2023-03-05 ASSESSMENT — VISUAL ACUITY
OD_BCVA: 20/20-2
OS_BCVA: 20/40

## 2023-03-05 ASSESSMENT — TONOMETRY
OD_IOP_MMHG: 16
OS_IOP_MMHG: 15

## 2023-03-05 ASSESSMENT — REFRACTION_AUTOREFRACTION
OS_SPHERE: +0.25
OD_AXIS: 077
OS_CYLINDER: -0.50
OD_SPHERE: +0.25
OD_CYLINDER: -1.00
OS_AXIS: 110

## 2023-03-05 ASSESSMENT — KERATOMETRY
OD_K2POWER_DIOPTERS: 43.00
METHOD_AUTO_MANUAL: AUTO
OD_AXISANGLE_DEGREES: 135
OS_AXISANGLE_DEGREES: 056
OD_K1POWER_DIOPTERS: 42.75
OS_K1POWER_DIOPTERS: 42.75
OS_K2POWER_DIOPTERS: 43.00

## 2023-03-05 ASSESSMENT — TEAR BREAK UP TIME (TBUT)
OD_TBUT: T 1+
OS_TBUT: T 1+

## 2023-03-05 ASSESSMENT — SPHEQUIV_DERIVED
OD_SPHEQUIV: 2
OS_SPHEQUIV: 0.125
OS_SPHEQUIV: 0.125
OS_SPHEQUIV: 0
OD_SPHEQUIV: -0.25
OD_SPHEQUIV: -0.5

## 2023-03-05 ASSESSMENT — CONFRONTATIONAL VISUAL FIELD TEST (CVF)
OS_FINDINGS: FULL
OD_FINDINGS: FULL

## 2023-03-05 ASSESSMENT — SUPERFICIAL PUNCTATE KERATITIS (SPK)
OD_SPK: T
OS_SPK: T

## 2023-03-05 ASSESSMENT — LID POSITION - PTOSIS
OD_PTOSIS: RUL T
OS_PTOSIS: LUL T

## 2023-03-17 ENCOUNTER — OFFICE (OUTPATIENT)
Dept: URBAN - METROPOLITAN AREA CLINIC 94 | Facility: CLINIC | Age: 68
Setting detail: OPHTHALMOLOGY
End: 2023-03-17
Payer: COMMERCIAL

## 2023-03-17 DIAGNOSIS — H26.493: ICD-10-CM

## 2023-03-17 DIAGNOSIS — H43.393: ICD-10-CM

## 2023-03-17 DIAGNOSIS — Z96.1: ICD-10-CM

## 2023-03-17 PROCEDURE — 99024 POSTOP FOLLOW-UP VISIT: CPT | Performed by: OPHTHALMOLOGY

## 2023-03-17 ASSESSMENT — SUPERFICIAL PUNCTATE KERATITIS (SPK)
OS_SPK: T
OD_SPK: T

## 2023-03-17 ASSESSMENT — REFRACTION_CURRENTRX
OD_CYLINDER: -1.00
OD_SPHERE: +2.00
OS_CYLINDER: -0.25
OS_ADD: +2.00
OD_ADD: +2.00
OD_SPHERE: +0.75
OS_ADD: +2.25
OD_CYLINDER: -1.00
OS_CYLINDER: -0.50
OD_AXIS: 047
OD_OVR_VA: 20/
OD_AXIS: 050
OS_AXIS: 154
OS_VPRISM_DIRECTION: PROGS
OS_OVR_VA: 20/
OD_ADD: +2.50
OD_VPRISM_DIRECTION: BF
OS_SPHERE: +1.50
OS_ADD: +2.50
OD_ADD: +2.25
OS_VPRISM_DIRECTION: BF
OS_OVR_VA: 20/
OD_SPHERE: +2.75
OS_AXIS: 166
OD_OVR_VA: 20/
OS_SPHERE: +0.75
OD_VPRISM_DIRECTION: PROGS
OS_OVR_VA: 20/
OS_SPHERE: +1.25
OS_VPRISM_DIRECTION: PROGS
OD_VPRISM_DIRECTION: PROGS
OD_OVR_VA: 20/

## 2023-03-17 ASSESSMENT — REFRACTION_MANIFEST
OD_VA1: 20/20
OS_VA2: 20/20
OS_ADD: +2.00
OD_VA1: 20/20
OD_CYLINDER: -0.75
OS_AXIS: 120
OD_AXIS: 080
OD_SPHERE: +2.25
OD_ADD: +2.25
OS_CYLINDER: -0.25
OD_SPHERE: PLANO
OD_AXIS: 075
OS_ADD: +2.25
OD_VA1: 20/30
OS_SPHERE: +0.25
OD_ADD: +2.50
OS_AXIS: 120
OS_ADD: +2.50
OS_SPHERE: +0.25
OS_VA1: 20/20
OD_SPHERE: -0.25
OS_VA1: 20/20-2
OD_VA2: 20/20
OS_CYLINDER: SPH
OD_SPHERE: +0.50
OD_CYLINDER: -1.00
OD_VA1: 20/25
OS_SPHERE: +1.00
OD_CYLINDER: -0.50
OS_CYLINDER: -0.25
OD_SPHERE: PLANO
OD_CYLINDER: -1.00
OD_AXIS: 080
OS_VA2: 20/30+2
OD_VA2: 20/20
OD_CYLINDER: -0.50
OS_VA1: 20/30
OD_VA1: 20/30-2
OD_ADD: +1.50
OD_AXIS: 060
OD_AXIS: 175

## 2023-03-17 ASSESSMENT — AXIALLENGTH_DERIVED
OD_AL: 23.9206
OD_AL: 23.8706
OD_AL: 23.0987
OS_AL: 23.8209
OD_AL: 24.0718
OS_AL: 23.8706
OS_AL: 23.8209

## 2023-03-17 ASSESSMENT — KERATOMETRY
OS_K2POWER_DIOPTERS: 43.00
OD_K2POWER_DIOPTERS: 43.00
OD_AXISANGLE_DEGREES: 154
OD_K1POWER_DIOPTERS: 42.50
OS_AXISANGLE_DEGREES: 065
OS_K1POWER_DIOPTERS: 42.50
METHOD_AUTO_MANUAL: AUTO

## 2023-03-17 ASSESSMENT — LID POSITION - PTOSIS
OS_PTOSIS: LUL T
OD_PTOSIS: RUL T

## 2023-03-17 ASSESSMENT — TEAR BREAK UP TIME (TBUT)
OS_TBUT: T 1+
OD_TBUT: T 1+

## 2023-03-17 ASSESSMENT — CONFRONTATIONAL VISUAL FIELD TEST (CVF)
OD_FINDINGS: FULL
OS_FINDINGS: FULL

## 2023-03-17 ASSESSMENT — SPHEQUIV_DERIVED
OD_SPHEQUIV: -0.5
OS_SPHEQUIV: 0
OS_SPHEQUIV: 0.125
OD_SPHEQUIV: 2
OD_SPHEQUIV: 0
OD_SPHEQUIV: -0.125
OS_SPHEQUIV: 0.125

## 2023-03-17 ASSESSMENT — REFRACTION_AUTOREFRACTION
OS_SPHERE: +0.25
OD_CYLINDER: -1.25
OS_CYLINDER: -0.50
OS_AXIS: 117
OD_AXIS: 079
OD_SPHERE: +0.50

## 2023-03-17 ASSESSMENT — TONOMETRY
OS_IOP_MMHG: 14
OD_IOP_MMHG: 14

## 2023-03-17 ASSESSMENT — VISUAL ACUITY
OD_BCVA: 20/25+1
OS_BCVA: 20/40-1

## 2023-03-22 ENCOUNTER — OFFICE (OUTPATIENT)
Dept: URBAN - METROPOLITAN AREA CLINIC 94 | Facility: CLINIC | Age: 68
Setting detail: OPHTHALMOLOGY
End: 2023-03-22
Payer: COMMERCIAL

## 2023-03-22 DIAGNOSIS — H35.033: ICD-10-CM

## 2023-03-22 DIAGNOSIS — H35.373: ICD-10-CM

## 2023-03-22 DIAGNOSIS — H43.393: ICD-10-CM

## 2023-03-22 PROBLEM — H26.493 POSTERIOR CAPSULAR OPACIFICATION; BOTH EYES: Status: ACTIVE | Noted: 2023-03-17

## 2023-03-22 PROCEDURE — 92012 INTRM OPH EXAM EST PATIENT: CPT | Performed by: OPHTHALMOLOGY

## 2023-03-22 PROCEDURE — 92134 CPTRZ OPH DX IMG PST SGM RTA: CPT | Performed by: OPHTHALMOLOGY

## 2023-03-22 PROCEDURE — 92235 FLUORESCEIN ANGRPH MLTIFRAME: CPT | Performed by: OPHTHALMOLOGY

## 2023-03-22 ASSESSMENT — KERATOMETRY
OD_K2POWER_DIOPTERS: 43.00
OS_AXISANGLE_DEGREES: 065
METHOD_AUTO_MANUAL: AUTO
OS_K1POWER_DIOPTERS: 42.50
OD_K1POWER_DIOPTERS: 42.50
OS_K2POWER_DIOPTERS: 43.00
OD_AXISANGLE_DEGREES: 154

## 2023-03-22 ASSESSMENT — CONFRONTATIONAL VISUAL FIELD TEST (CVF)
OD_FINDINGS: FULL
OS_FINDINGS: FULL

## 2023-03-22 ASSESSMENT — REFRACTION_AUTOREFRACTION
OS_CYLINDER: -0.50
OS_SPHERE: +0.25
OD_CYLINDER: -1.25
OD_AXIS: 079
OD_SPHERE: +0.50
OS_AXIS: 117

## 2023-03-22 ASSESSMENT — AXIALLENGTH_DERIVED
OS_AL: 23.8706
OD_AL: 23.9206

## 2023-03-22 ASSESSMENT — SUPERFICIAL PUNCTATE KERATITIS (SPK)
OD_SPK: T
OS_SPK: T

## 2023-03-22 ASSESSMENT — TONOMETRY
OD_IOP_MMHG: 15
OS_IOP_MMHG: 15

## 2023-03-22 ASSESSMENT — LID POSITION - PTOSIS
OD_PTOSIS: RUL T
OS_PTOSIS: LUL T

## 2023-03-22 ASSESSMENT — TEAR BREAK UP TIME (TBUT)
OS_TBUT: T 1+
OD_TBUT: T 1+

## 2023-03-22 ASSESSMENT — VISUAL ACUITY
OD_BCVA: 20/20
OS_BCVA: 20/60+1

## 2023-03-22 ASSESSMENT — SPHEQUIV_DERIVED
OD_SPHEQUIV: -0.125
OS_SPHEQUIV: 0

## 2023-03-29 ENCOUNTER — APPOINTMENT (OUTPATIENT)
Dept: GASTROENTEROLOGY | Facility: CLINIC | Age: 68
End: 2023-03-29
Payer: MEDICARE

## 2023-03-29 VITALS
SYSTOLIC BLOOD PRESSURE: 120 MMHG | BODY MASS INDEX: 25.19 KG/M2 | HEIGHT: 72 IN | HEART RATE: 70 BPM | DIASTOLIC BLOOD PRESSURE: 75 MMHG | WEIGHT: 186 LBS | OXYGEN SATURATION: 98 % | RESPIRATION RATE: 16 BRPM

## 2023-03-29 DIAGNOSIS — Z78.9 OTHER SPECIFIED HEALTH STATUS: ICD-10-CM

## 2023-03-29 DIAGNOSIS — Z86.39 PERSONAL HISTORY OF OTHER ENDOCRINE, NUTRITIONAL AND METABOLIC DISEASE: ICD-10-CM

## 2023-03-29 DIAGNOSIS — Z12.11 ENCOUNTER FOR SCREENING FOR MALIGNANT NEOPLASM OF COLON: ICD-10-CM

## 2023-03-29 DIAGNOSIS — Z86.79 PERSONAL HISTORY OF OTHER DISEASES OF THE CIRCULATORY SYSTEM: ICD-10-CM

## 2023-03-29 PROBLEM — Z00.00 ENCOUNTER FOR PREVENTIVE HEALTH EXAMINATION: Status: ACTIVE | Noted: 2023-03-29

## 2023-03-29 PROCEDURE — 99203 OFFICE O/P NEW LOW 30 MIN: CPT

## 2023-03-29 RX ORDER — PSYLLIUM HUSK 0.4 G
CAPSULE ORAL
Refills: 0 | Status: ACTIVE | COMMUNITY

## 2023-03-29 RX ORDER — LISINOPRIL 20 MG/1
20 TABLET ORAL
Refills: 0 | Status: ACTIVE | COMMUNITY

## 2023-03-29 RX ORDER — SIMVASTATIN 10 MG/1
10 TABLET, FILM COATED ORAL
Refills: 0 | Status: ACTIVE | COMMUNITY

## 2023-03-29 RX ORDER — LEVOTHYROXINE SODIUM 0.17 MG/1
TABLET ORAL
Refills: 0 | Status: ACTIVE | COMMUNITY

## 2023-03-29 RX ORDER — CHOLECALCIFEROL (VITAMIN D3) 25 MCG
TABLET ORAL
Refills: 0 | Status: ACTIVE | COMMUNITY

## 2023-03-29 NOTE — ASSESSMENT
[FreeTextEntry1] : At this time the patient is due for a screening colonoscopy which will be scheduled.  I will obtain the results of his recent blood test performed by his primary care physician for my review. The risks, benefits, complications and possible adverse consequences associated with colonoscopy were discussed with the patient.\par

## 2023-03-29 NOTE — PHYSICAL EXAM
[Alert] : alert [Normal Voice/Communication] : normal voice/communication [Healthy Appearing] : healthy appearing [No Acute Distress] : no acute distress [Sclera] : the sclera and conjunctiva were normal [Hearing Threshold Finger Rub Not [place]] : hearing was normal [Normal Lips/Gums] : the lips and gums were normal [Oropharynx] : the oropharynx was normal [Normal Appearance] : the appearance of the neck was normal [No Neck Mass] : no neck mass was observed [No Respiratory Distress] : no respiratory distress [No Acc Muscle Use] : no accessory muscle use [Respiration, Rhythm And Depth] : normal respiratory rhythm and effort [Auscultation Breath Sounds / Voice Sounds] : lungs were clear to auscultation bilaterally [Heart Rate And Rhythm] : heart rate was normal and rhythm regular [Normal S1, S2] : normal S1 and S2 [Murmurs] : no murmurs [Bowel Sounds] : normal bowel sounds [Abdomen Tenderness] : non-tender [No Masses] : no abdominal mass palpated [Abdomen Soft] : soft [] : no hepatosplenomegaly [Abnormal Walk] : normal gait [Involuntary Movements] : no involuntary movements were seen [Motor Tone] : muscle strength and tone were normal [Normal Color / Pigmentation] : normal skin color and pigmentation [No Focal Deficits] : no focal deficits [Motor Exam] : the motor exam was normal [Oriented To Time, Place, And Person] : oriented to person, place, and time [Normal Affect] : the affect was normal [Normal Mood] : the mood was normal

## 2023-03-29 NOTE — HISTORY OF PRESENT ILLNESS
[FreeTextEntry1] : The patient is referred for colon cancer screening.  He apparently underwent a colonoscopy 10 years ago by another gastroenterologist the name of whom he does not recall that was normal with perhaps the presence of diverticuli.  He denies any diarrhea or constipation.  There is no rectal bleeding or melena.  There is no prior history of colon polyps.  There is no family history of colon cancer.  For many years he will experience a dull very minor ache in the lower abdomen which can occur on any given day and may not occur for weeks or months at a time.  There are no particular inciting or alleviating factors and he is currently asymptomatic.  He denies any dyspeptic symptoms.  His appetite and weight are stable.  He has undergone bilateral inguinal hernia repairs.

## 2023-05-09 ENCOUNTER — TRANSCRIPTION ENCOUNTER (OUTPATIENT)
Age: 68
End: 2023-05-09

## 2023-05-10 ENCOUNTER — APPOINTMENT (OUTPATIENT)
Dept: GASTROENTEROLOGY | Facility: GI CENTER | Age: 68
End: 2023-05-10
Payer: MEDICARE

## 2023-05-10 ENCOUNTER — OUTPATIENT (OUTPATIENT)
Dept: OUTPATIENT SERVICES | Facility: HOSPITAL | Age: 68
LOS: 1 days | End: 2023-05-10
Payer: MEDICARE

## 2023-05-10 ENCOUNTER — RESULT REVIEW (OUTPATIENT)
Age: 68
End: 2023-05-10

## 2023-05-10 DIAGNOSIS — K57.30 DIVERTICULOSIS OF LARGE INTESTINE W/OUT PERFORATION OR ABSCESS W/OUT BLEEDING: ICD-10-CM

## 2023-05-10 DIAGNOSIS — R10.31 RIGHT LOWER QUADRANT PAIN: ICD-10-CM

## 2023-05-10 DIAGNOSIS — Z12.11 ENCOUNTER FOR SCREENING FOR MALIGNANT NEOPLASM OF COLON: ICD-10-CM

## 2023-05-10 DIAGNOSIS — Z98.890 OTHER SPECIFIED POSTPROCEDURAL STATES: Chronic | ICD-10-CM

## 2023-05-10 PROCEDURE — 45385 COLONOSCOPY W/LESION REMOVAL: CPT | Mod: PT

## 2023-05-10 PROCEDURE — 88305 TISSUE EXAM BY PATHOLOGIST: CPT | Mod: 26

## 2023-05-10 PROCEDURE — 88305 TISSUE EXAM BY PATHOLOGIST: CPT

## 2023-05-10 PROCEDURE — 45385 COLONOSCOPY W/LESION REMOVAL: CPT

## 2023-05-10 NOTE — PHYSICAL EXAM
[Alert] : alert [Normal Voice/Communication] : normal voice/communication [Healthy Appearing] : healthy appearing [No Acute Distress] : no acute distress [Sclera] : the sclera and conjunctiva were normal [Hearing Threshold Finger Rub Not [place]] : hearing was normal [Normal Lips/Gums] : the lips and gums were normal [Oropharynx] : the oropharynx was normal [Normal Appearance] : the appearance of the neck was normal [No Neck Mass] : no neck mass was observed [No Respiratory Distress] : no respiratory distress [No Acc Muscle Use] : no accessory muscle use [Respiration, Rhythm And Depth] : normal respiratory rhythm and effort [Auscultation Breath Sounds / Voice Sounds] : lungs were clear to auscultation bilaterally [Heart Rate And Rhythm] : heart rate was normal and rhythm regular [Normal S1, S2] : normal S1 and S2 [Murmurs] : no murmurs [Bowel Sounds] : normal bowel sounds [Abdomen Tenderness] : non-tender [No Masses] : no abdominal mass palpated [Abdomen Soft] : soft [] : no hepatosplenomegaly [Abnormal Walk] : normal gait [Involuntary Movements] : no involuntary movements were seen [Motor Tone] : muscle strength and tone were normal [Normal Color / Pigmentation] : normal skin color and pigmentation [No Focal Deficits] : no focal deficits [Motor Exam] : the motor exam was normal [Oriented To Time, Place, And Person] : oriented to person, place, and time [Normal Affect] : the affect was normal [Normal Mood] : the mood was normal

## 2023-05-15 LAB — SURGICAL PATHOLOGY STUDY: SIGNIFICANT CHANGE UP

## 2023-06-27 ENCOUNTER — OFFICE (OUTPATIENT)
Dept: URBAN - METROPOLITAN AREA CLINIC 94 | Facility: CLINIC | Age: 68
Setting detail: OPHTHALMOLOGY
End: 2023-06-27
Payer: MEDICARE

## 2023-06-27 DIAGNOSIS — H35.033: ICD-10-CM

## 2023-06-27 DIAGNOSIS — H43.393: ICD-10-CM

## 2023-06-27 DIAGNOSIS — H35.373: ICD-10-CM

## 2023-06-27 PROBLEM — H43.391: Status: ACTIVE | Noted: 2023-06-27

## 2023-06-27 PROCEDURE — 92134 CPTRZ OPH DX IMG PST SGM RTA: CPT | Performed by: OPHTHALMOLOGY

## 2023-06-27 PROCEDURE — 92012 INTRM OPH EXAM EST PATIENT: CPT | Performed by: OPHTHALMOLOGY

## 2023-06-27 ASSESSMENT — AXIALLENGTH_DERIVED
OD_AL: 23.9206
OS_AL: 23.8706

## 2023-06-27 ASSESSMENT — VISUAL ACUITY
OS_BCVA: 20/80
OD_BCVA: 20/20

## 2023-06-27 ASSESSMENT — REFRACTION_AUTOREFRACTION
OD_AXIS: 079
OS_SPHERE: +0.25
OS_AXIS: 117
OS_CYLINDER: -0.50
OD_CYLINDER: -1.25
OD_SPHERE: +0.50

## 2023-06-27 ASSESSMENT — SPHEQUIV_DERIVED
OD_SPHEQUIV: -0.125
OS_SPHEQUIV: 0

## 2023-06-27 ASSESSMENT — SUPERFICIAL PUNCTATE KERATITIS (SPK)
OS_SPK: T
OD_SPK: T

## 2023-06-27 ASSESSMENT — KERATOMETRY
OS_K1POWER_DIOPTERS: 42.50
OD_AXISANGLE_DEGREES: 154
OD_K1POWER_DIOPTERS: 42.50
OS_K2POWER_DIOPTERS: 43.00
OD_K2POWER_DIOPTERS: 43.00
OS_AXISANGLE_DEGREES: 065
METHOD_AUTO_MANUAL: AUTO

## 2023-06-27 ASSESSMENT — TONOMETRY
OD_IOP_MMHG: 13
OS_IOP_MMHG: 13

## 2023-06-27 ASSESSMENT — LID POSITION - PTOSIS
OS_PTOSIS: LUL T
OD_PTOSIS: RUL T

## 2023-06-27 ASSESSMENT — TEAR BREAK UP TIME (TBUT)
OD_TBUT: T 1+
OS_TBUT: T 1+

## 2023-08-24 ENCOUNTER — OFFICE (OUTPATIENT)
Dept: URBAN - METROPOLITAN AREA CLINIC 94 | Facility: CLINIC | Age: 68
Setting detail: OPHTHALMOLOGY
End: 2023-08-24
Payer: MEDICARE

## 2023-08-24 DIAGNOSIS — H43.393: ICD-10-CM

## 2023-08-24 PROCEDURE — 92012 INTRM OPH EXAM EST PATIENT: CPT | Performed by: OPHTHALMOLOGY

## 2023-08-24 PROCEDURE — 92250 FUNDUS PHOTOGRAPHY W/I&R: CPT | Performed by: OPHTHALMOLOGY

## 2023-08-24 ASSESSMENT — VISUAL ACUITY
OS_BCVA: 20/70
OD_BCVA: 20/40

## 2023-08-24 ASSESSMENT — REFRACTION_AUTOREFRACTION
OD_SPHERE: +0.25
OD_CYLINDER: -1.00
OS_AXIS: 109
OS_SPHERE: +0.50
OS_CYLINDER: -0.75
OD_AXIS: 079

## 2023-08-24 ASSESSMENT — SPHEQUIV_DERIVED
OS_SPHEQUIV: 0.125
OD_SPHEQUIV: -0.25

## 2023-08-24 ASSESSMENT — KERATOMETRY
OD_AXISANGLE_DEGREES: 129
OS_AXISANGLE_DEGREES: 073
OS_K2POWER_DIOPTERS: 42.75
OS_K1POWER_DIOPTERS: 42.25
OD_K2POWER_DIOPTERS: 43.00
METHOD_AUTO_MANUAL: AUTO
OD_K1POWER_DIOPTERS: 42.50

## 2023-08-24 ASSESSMENT — TONOMETRY
OD_IOP_MMHG: 17
OS_IOP_MMHG: 15

## 2023-08-24 ASSESSMENT — SUPERFICIAL PUNCTATE KERATITIS (SPK)
OS_SPK: T
OD_SPK: T

## 2023-08-24 ASSESSMENT — CONFRONTATIONAL VISUAL FIELD TEST (CVF)
OS_FINDINGS: FULL
OD_FINDINGS: FULL

## 2023-08-24 ASSESSMENT — LID POSITION - PTOSIS
OD_PTOSIS: RUL T
OS_PTOSIS: LUL T

## 2023-08-24 ASSESSMENT — AXIALLENGTH_DERIVED
OS_AL: 23.9149
OD_AL: 23.9708

## 2023-08-24 ASSESSMENT — TEAR BREAK UP TIME (TBUT)
OD_TBUT: T 1+
OS_TBUT: T 1+

## 2023-10-17 ENCOUNTER — OFFICE (OUTPATIENT)
Dept: URBAN - METROPOLITAN AREA CLINIC 94 | Facility: CLINIC | Age: 68
Setting detail: OPHTHALMOLOGY
End: 2023-10-17
Payer: MEDICARE

## 2023-10-17 DIAGNOSIS — H35.033: ICD-10-CM

## 2023-10-17 DIAGNOSIS — H35.373: ICD-10-CM

## 2023-10-17 DIAGNOSIS — H43.393: ICD-10-CM

## 2023-10-17 PROCEDURE — 92012 INTRM OPH EXAM EST PATIENT: CPT | Performed by: OPHTHALMOLOGY

## 2023-10-17 PROCEDURE — 92134 CPTRZ OPH DX IMG PST SGM RTA: CPT | Performed by: OPHTHALMOLOGY

## 2023-10-17 ASSESSMENT — VISUAL ACUITY
OS_BCVA: 20/60
OD_BCVA: 20/30

## 2023-10-17 ASSESSMENT — KERATOMETRY
OD_AXISANGLE_DEGREES: 129
OS_K2POWER_DIOPTERS: 42.75
METHOD_AUTO_MANUAL: AUTO
OS_AXISANGLE_DEGREES: 073
OD_K2POWER_DIOPTERS: 43.00
OS_K1POWER_DIOPTERS: 42.25
OD_K1POWER_DIOPTERS: 42.50

## 2023-10-17 ASSESSMENT — SUPERFICIAL PUNCTATE KERATITIS (SPK)
OD_SPK: T
OS_SPK: T

## 2023-10-17 ASSESSMENT — AXIALLENGTH_DERIVED
OD_AL: 23.9708
OS_AL: 23.9149

## 2023-10-17 ASSESSMENT — SPHEQUIV_DERIVED
OS_SPHEQUIV: 0.125
OD_SPHEQUIV: -0.25

## 2023-10-17 ASSESSMENT — LID POSITION - PTOSIS
OS_PTOSIS: LUL T
OD_PTOSIS: RUL T

## 2023-10-17 ASSESSMENT — REFRACTION_AUTOREFRACTION
OD_SPHERE: +0.25
OD_CYLINDER: -1.00
OD_AXIS: 079
OS_AXIS: 109
OS_SPHERE: +0.50
OS_CYLINDER: -0.75

## 2023-10-17 ASSESSMENT — CONFRONTATIONAL VISUAL FIELD TEST (CVF)
OD_FINDINGS: FULL
OS_FINDINGS: FULL

## 2023-10-17 ASSESSMENT — TONOMETRY
OD_IOP_MMHG: 16
OS_IOP_MMHG: 16

## 2023-10-17 ASSESSMENT — TEAR BREAK UP TIME (TBUT)
OS_TBUT: T 1+
OD_TBUT: T 1+

## 2023-11-16 ENCOUNTER — OFFICE (OUTPATIENT)
Dept: URBAN - METROPOLITAN AREA CLINIC 94 | Facility: CLINIC | Age: 68
Setting detail: OPHTHALMOLOGY
End: 2023-11-16
Payer: MEDICARE

## 2023-11-16 DIAGNOSIS — H52.11: ICD-10-CM

## 2023-11-16 PROCEDURE — SCREF LASIK EVAL: Performed by: OPHTHALMOLOGY

## 2023-11-16 ASSESSMENT — REFRACTION_MANIFEST
OS_ADD: +2.00
OD_VA2: 20/20
OS_AXIS: 110
OD_AXIS: 075
OS_CYLINDER: -0.25
OU_VA: 20/20
OS_VA1: 20/20
OS_VA2: 20/20
OS_SPHERE: +0.50
OD_SPHERE: +0.25
OD_CYLINDER: -1.00
OD_ADD: +2.00
OD_VA1: 20/25-2

## 2023-11-16 ASSESSMENT — SUPERFICIAL PUNCTATE KERATITIS (SPK)
OD_SPK: T
OS_SPK: T

## 2023-11-16 ASSESSMENT — SPHEQUIV_DERIVED
OD_SPHEQUIV: -0.375
OD_SPHEQUIV: -0.25
OS_SPHEQUIV: 0.25
OS_SPHEQUIV: 0.375

## 2023-11-16 ASSESSMENT — CONFRONTATIONAL VISUAL FIELD TEST (CVF)
OS_FINDINGS: FULL
OD_FINDINGS: FULL

## 2023-11-16 ASSESSMENT — LID POSITION - PTOSIS
OD_PTOSIS: RUL T
OS_PTOSIS: LUL T

## 2023-11-16 ASSESSMENT — TEAR BREAK UP TIME (TBUT)
OD_TBUT: T 1+
OS_TBUT: T 1+

## 2023-11-16 ASSESSMENT — REFRACTION_AUTOREFRACTION
OS_CYLINDER: -0.50
OS_SPHERE: +0.50
OS_AXIS: 110
OD_AXIS: 075
OD_SPHERE: +0.25
OD_CYLINDER: -1.25

## 2023-11-30 ENCOUNTER — OTHER LOCATION (OUTPATIENT)
Dept: URBAN - METROPOLITAN AREA LASIK CENTER 6 | Facility: LASIK CENTER | Age: 68
Setting detail: OPHTHALMOLOGY
End: 2023-11-30
Payer: MEDICARE

## 2023-11-30 DIAGNOSIS — H52.11: ICD-10-CM

## 2023-11-30 PROCEDURE — LVC SURGER LASER VISION CORRECTION: Performed by: OPHTHALMOLOGY

## 2023-11-30 ASSESSMENT — SPHEQUIV_DERIVED
OD_SPHEQUIV: -0.375
OS_SPHEQUIV: 0.25
OD_SPHEQUIV: -0.25
OS_SPHEQUIV: 0.375

## 2023-11-30 ASSESSMENT — REFRACTION_MANIFEST
OS_SPHERE: +0.50
OD_SPHERE: +0.25
OS_ADD: +2.00
OS_AXIS: 110
OD_ADD: +2.00
OS_CYLINDER: -0.25
OU_VA: 20/20
OD_AXIS: 075
OD_VA1: 20/25-2
OD_CYLINDER: -1.00
OS_VA1: 20/20
OS_VA2: 20/20
OD_VA2: 20/20

## 2023-11-30 ASSESSMENT — REFRACTION_AUTOREFRACTION
OS_AXIS: 110
OD_CYLINDER: -1.25
OS_SPHERE: +0.50
OD_SPHERE: +0.25
OS_CYLINDER: -0.50
OD_AXIS: 075

## 2023-12-01 ENCOUNTER — OFFICE (OUTPATIENT)
Dept: URBAN - METROPOLITAN AREA CLINIC 94 | Facility: CLINIC | Age: 68
Setting detail: OPHTHALMOLOGY
End: 2023-12-01
Payer: MEDICARE

## 2023-12-01 DIAGNOSIS — H17.821: ICD-10-CM

## 2023-12-01 PROCEDURE — 99024 POSTOP FOLLOW-UP VISIT: CPT | Performed by: PHYSICIAN ASSISTANT

## 2023-12-01 ASSESSMENT — REFRACTION_MANIFEST
OD_ADD: +2.00
OS_SPHERE: +0.50
OS_ADD: +2.00
OS_AXIS: 110
OS_VA2: 20/20
OU_VA: 20/20
OD_VA2: 20/20
OD_CYLINDER: -1.00
OD_AXIS: 075
OS_CYLINDER: -0.25
OD_VA1: 20/25-2
OS_VA1: 20/20
OD_SPHERE: +0.25

## 2023-12-01 ASSESSMENT — SPHEQUIV_DERIVED
OS_SPHEQUIV: 0.375
OD_SPHEQUIV: -0.25
OD_SPHEQUIV: 0.125
OS_SPHEQUIV: 0.125

## 2023-12-01 ASSESSMENT — LID POSITION - PTOSIS
OS_PTOSIS: LUL T
OD_PTOSIS: RUL T

## 2023-12-01 ASSESSMENT — REFRACTION_AUTOREFRACTION
OS_AXIS: 094
OS_CYLINDER: -0.75
OS_SPHERE: +0.50
OD_AXIS: 007
OD_CYLINDER: -0.25
OD_SPHERE: +0.25

## 2023-12-01 ASSESSMENT — TEAR BREAK UP TIME (TBUT)
OS_TBUT: T 1+
OD_TBUT: T 1+

## 2023-12-01 ASSESSMENT — SUPERFICIAL PUNCTATE KERATITIS (SPK)
OD_SPK: T
OS_SPK: T

## 2023-12-01 ASSESSMENT — CONFRONTATIONAL VISUAL FIELD TEST (CVF)
OD_FINDINGS: FULL
OS_FINDINGS: FULL

## 2023-12-08 ENCOUNTER — OFFICE (OUTPATIENT)
Dept: URBAN - METROPOLITAN AREA CLINIC 94 | Facility: CLINIC | Age: 68
Setting detail: OPHTHALMOLOGY
End: 2023-12-08
Payer: MEDICARE

## 2023-12-08 DIAGNOSIS — H17.821: ICD-10-CM

## 2023-12-08 PROCEDURE — 99024 POSTOP FOLLOW-UP VISIT: CPT | Performed by: PHYSICIAN ASSISTANT

## 2023-12-08 ASSESSMENT — REFRACTION_AUTOREFRACTION
OD_AXIS: 177
OS_AXIS: 115
OD_SPHERE: +0.25
OS_SPHERE: +0.50
OD_CYLINDER: -0.25
OS_CYLINDER: -0.75

## 2023-12-08 ASSESSMENT — SPHEQUIV_DERIVED
OS_SPHEQUIV: 0.375
OS_SPHEQUIV: 0.125
OD_SPHEQUIV: -0.25
OD_SPHEQUIV: 0.125

## 2023-12-08 ASSESSMENT — REFRACTION_MANIFEST
OS_AXIS: 110
OS_CYLINDER: -0.25
OD_VA2: 20/20
OS_VA1: 20/20
OS_SPHERE: +0.50
OS_VA2: 20/20
OD_VA1: 20/25-2
OU_VA: 20/20
OD_SPHERE: +0.25
OD_AXIS: 075
OD_CYLINDER: -1.00
OS_ADD: +2.00
OD_ADD: +2.00

## 2023-12-08 ASSESSMENT — TEAR BREAK UP TIME (TBUT)
OD_TBUT: T 1+
OS_TBUT: T 1+

## 2023-12-08 ASSESSMENT — LID POSITION - PTOSIS
OS_PTOSIS: LUL T
OD_PTOSIS: RUL T

## 2023-12-08 ASSESSMENT — CONFRONTATIONAL VISUAL FIELD TEST (CVF)
OD_FINDINGS: FULL
OS_FINDINGS: FULL

## 2023-12-08 ASSESSMENT — SUPERFICIAL PUNCTATE KERATITIS (SPK)
OD_SPK: T
OS_SPK: T

## 2023-12-20 ENCOUNTER — APPOINTMENT (OUTPATIENT)
Dept: NEUROLOGY | Facility: CLINIC | Age: 68
End: 2023-12-20
Payer: MEDICARE

## 2023-12-20 VITALS
HEIGHT: 72 IN | WEIGHT: 180 LBS | SYSTOLIC BLOOD PRESSURE: 138 MMHG | BODY MASS INDEX: 24.38 KG/M2 | DIASTOLIC BLOOD PRESSURE: 80 MMHG

## 2023-12-20 PROCEDURE — 99204 OFFICE O/P NEW MOD 45 MIN: CPT

## 2023-12-20 RX ORDER — SODIUM SULFATE, POTASSIUM SULFATE AND MAGNESIUM SULFATE 1.6; 3.13; 17.5 G/177ML; G/177ML; G/177ML
17.5-3.13-1.6 SOLUTION ORAL
Qty: 1 | Refills: 0 | Status: COMPLETED | COMMUNITY
Start: 2023-03-29 | End: 2023-12-20

## 2023-12-20 RX ORDER — POLYETHYLENE GLYCOL-3350 AND ELECTROLYTES WITH FLAVOR PACK 240; 5.84; 2.98; 6.72; 22.72 G/278.26G; G/278.26G; G/278.26G; G/278.26G; G/278.26G
240 POWDER, FOR SOLUTION ORAL
Qty: 1 | Refills: 0 | Status: COMPLETED | COMMUNITY
Start: 2023-03-31 | End: 2023-12-20

## 2023-12-20 RX ORDER — POLYETHYLENE GLYCOL-3350 AND ELECTROLYTES WITH FLAVOR PACK 240; 5.84; 2.98; 6.72; 22.72 G/278.26G; G/278.26G; G/278.26G; G/278.26G; G/278.26G
240 POWDER, FOR SOLUTION ORAL
Qty: 1 | Refills: 0 | Status: COMPLETED | COMMUNITY
Start: 2023-03-30 | End: 2023-12-20

## 2023-12-20 RX ORDER — CYCLOBENZAPRINE HYDROCHLORIDE 5 MG/1
5 TABLET, FILM COATED ORAL TWICE DAILY
Qty: 60 | Refills: 2 | Status: ACTIVE | COMMUNITY
Start: 2023-12-20 | End: 1900-01-01

## 2023-12-20 NOTE — HISTORY OF PRESENT ILLNESS
[FreeTextEntry1] : I saw this patient in the office today.  He describes that he has been having twitching predominantly below his right eye. This has been going on for the past 4 to 5 years (definitely prior to the pandemic onset). It has become constant. It waxes and wanes in intensity. It is becoming bothersome to him.

## 2023-12-20 NOTE — ASSESSMENT
[FreeTextEntry1] : This is a 68-year-old man who describes right sided blepharospasm.  I will obtain an MRI of the brain to rule out demyelinating or cerebrovascular pathology. I have suggested a trial of cyclobenzaprine starting at 10 mg twice per day.  I have explained that often for this condition, systemic muscle relaxers do not improve the condition and Botox injections may be required.  I will see him back after the imaging and if he requires referral to someone who performs Botox injections, I will take care of this.

## 2023-12-20 NOTE — CONSULT LETTER
[Dear  ___] : Dear  [unfilled], [Courtesy Letter:] : I had the pleasure of seeing your patient, [unfilled], in my office today. [Please see my note below.] : Please see my note below. [Consult Closing:] : Thank you very much for allowing me to participate in the care of this patient.  If you have any questions, please do not hesitate to contact me. [Sincerely,] : Sincerely, [FreeTextEntry3] : Carlyle Riley MD.

## 2023-12-20 NOTE — PHYSICAL EXAM
[General Appearance - Alert] : alert [General Appearance - In No Acute Distress] : in no acute distress [Oriented To Time, Place, And Person] : oriented to person, place, and time [Affect] : the affect was normal [Memory Recent] : recent memory was not impaired [Memory Remote] : remote memory was not impaired [Dysarthria] : no dysarthria [Aphasia] : no dysphasia/aphasia [Cranial Nerves Optic (II)] : visual acuity intact bilaterally,  visual fields full to confrontation, pupils equal round and reactive to light [Cranial Nerves Oculomotor (III)] : extraocular motion intact [Cranial Nerves Trigeminal (V)] : facial sensation intact symmetrically [Cranial Nerves Facial (VII)] : face symmetrical [Cranial Nerves Vestibulocochlear (VIII)] : hearing was intact bilaterally [Cranial Nerves Glossopharyngeal (IX)] : tongue and palate midline [Cranial Nerves Accessory (XI - Cranial And Spinal)] : head turning and shoulder shrug symmetric [Cranial Nerves Hypoglossal (XII)] : there was no tongue deviation with protrusion [Motor Tone] : muscle tone was normal in all four extremities [Motor Strength] : muscle strength was normal in all four extremities [Sensation Tactile Decrease] : light touch was intact [Sensation Pain / Temperature Decrease] : pain and temperature was intact [Sensation Vibration Decrease] : vibration was intact [Romberg's Sign] : Romberg's sign was negtive [Abnormal Walk] : normal gait [Coordination - Dysmetria Impaired Finger-to-Nose Bilateral] : not present [2+] : Ankle jerk left 2+ [Plantar Reflex Right Only] : normal on the right [Plantar Reflex Left Only] : normal on the left [FreeTextEntry5] : There is occasional involuntary muscle twitching of the lower lid on the right. [Optic Disc Abnormality] : the optic disc were normal in size and color

## 2023-12-29 ENCOUNTER — NON-APPOINTMENT (OUTPATIENT)
Age: 68
End: 2023-12-29

## 2024-03-03 ENCOUNTER — OFFICE (OUTPATIENT)
Dept: URBAN - METROPOLITAN AREA CLINIC 94 | Facility: CLINIC | Age: 69
Setting detail: OPHTHALMOLOGY
End: 2024-03-03
Payer: MEDICARE

## 2024-03-03 DIAGNOSIS — H17.821: ICD-10-CM

## 2024-03-03 PROCEDURE — 92012 INTRM OPH EXAM EST PATIENT: CPT | Performed by: REGISTERED NURSE

## 2024-03-03 ASSESSMENT — LID POSITION - PTOSIS
OD_PTOSIS: RUL T
OS_PTOSIS: LUL T

## 2024-03-04 ASSESSMENT — REFRACTION_MANIFEST
OD_AXIS: 075
OD_ADD: +2.00
OS_ADD: +2.00
OS_AXIS: 110
OU_VA: 20/20
OD_SPHERE: +0.25
OD_VA2: 20/20
OS_VA2: 20/20
OD_VA1: 20/25-2
OS_CYLINDER: -0.25
OS_VA1: 20/20
OS_SPHERE: +0.50
OD_CYLINDER: -1.00

## 2024-03-04 ASSESSMENT — SPHEQUIV_DERIVED
OS_SPHEQUIV: 0.375
OD_SPHEQUIV: -0.25

## 2024-03-12 ENCOUNTER — APPOINTMENT (OUTPATIENT)
Dept: NEUROLOGY | Facility: CLINIC | Age: 69
End: 2024-03-12
Payer: MEDICARE

## 2024-03-12 VITALS
DIASTOLIC BLOOD PRESSURE: 90 MMHG | SYSTOLIC BLOOD PRESSURE: 138 MMHG | HEIGHT: 72 IN | BODY MASS INDEX: 24.38 KG/M2 | WEIGHT: 180 LBS

## 2024-03-12 DIAGNOSIS — G24.5 BLEPHAROSPASM: ICD-10-CM

## 2024-03-12 PROCEDURE — 99213 OFFICE O/P EST LOW 20 MIN: CPT

## 2024-03-12 PROCEDURE — G2211 COMPLEX E/M VISIT ADD ON: CPT

## 2024-03-12 NOTE — PHYSICAL EXAM
[General Appearance - Alert] : alert [General Appearance - In No Acute Distress] : in no acute distress [Oriented To Time, Place, And Person] : oriented to person, place, and time [Affect] : the affect was normal [Memory Recent] : recent memory was not impaired [Memory Remote] : remote memory was not impaired [Aphasia] : no dysphasia/aphasia [Dysarthria] : no dysarthria [Cranial Nerves Optic (II)] : visual acuity intact bilaterally,  visual fields full to confrontation, pupils equal round and reactive to light [Cranial Nerves Oculomotor (III)] : extraocular motion intact [Cranial Nerves Trigeminal (V)] : facial sensation intact symmetrically [Cranial Nerves Facial (VII)] : face symmetrical [Cranial Nerves Vestibulocochlear (VIII)] : hearing was intact bilaterally [Cranial Nerves Glossopharyngeal (IX)] : tongue and palate midline [Cranial Nerves Accessory (XI - Cranial And Spinal)] : head turning and shoulder shrug symmetric [Cranial Nerves Hypoglossal (XII)] : there was no tongue deviation with protrusion [Motor Tone] : muscle tone was normal in all four extremities [Sensation Tactile Decrease] : light touch was intact [Motor Strength] : muscle strength was normal in all four extremities [Sensation Pain / Temperature Decrease] : pain and temperature was intact [Sensation Vibration Decrease] : vibration was intact [Abnormal Walk] : normal gait [Romberg's Sign] : Romberg's sign was negtive [Coordination - Dysmetria Impaired Finger-to-Nose Bilateral] : not present [2+] : Patella left 2+ [Plantar Reflex Right Only] : normal on the right [FreeTextEntry5] : There is occasional involuntary muscle twitching of the lower lid on the right. [Plantar Reflex Left Only] : normal on the left [Optic Disc Abnormality] : the optic disc were normal in size and color

## 2024-03-12 NOTE — ASSESSMENT
[FreeTextEntry1] : This is a 68-year-old man who describes right sided blepharospasm. Brain MRI was unremarkable. There has been no improvement with cyclobenzaprine. I have suggested he discontinue it.  I will refer him to [facility] for evaluation for Botox injection.

## 2024-03-12 NOTE — DATA REVIEWED
[de-identified] : Brain MRI was performed on 12/28/2023 at [facility]. The study was unremarkable.

## 2024-03-12 NOTE — HISTORY OF PRESENT ILLNESS
[FreeTextEntry1] : I saw this patient in the office today.  As you recall, he described that he has been having twitching predominantly below his right eye. This has been going on for the past 4 to 5 years (definitely prior to the pandemic onset). It has become constant. It waxes and wanes in intensity. It is becoming bothersome to him.  3/12/2024 visit: There has been no improvement with cyclobenzaprine.

## 2024-04-19 NOTE — H&P PST ADULT - SOURCE OF INFORMATION, PROFILE
Rx Refill Note  Requested Prescriptions     Pending Prescriptions Disp Refills    lisinopril (PRINIVIL,ZESTRIL) 40 MG tablet 30 tablet 1     Sig: Take 1 tablet by mouth Daily.      Last office visit with prescribing clinician: 2/16/2024   Last telemedicine visit with prescribing clinician: Visit date not found   Next office visit with prescribing clinician: 5/3/2024                         Would you like a call back once the refill request has been completed: [] Yes [] No    If the office needs to give you a call back, can they leave a voicemail: [] Yes [] No    Aria Pardo MA  04/19/24, 10:37 EDT     patient

## 2024-07-12 ENCOUNTER — OFFICE (OUTPATIENT)
Dept: URBAN - METROPOLITAN AREA CLINIC 94 | Facility: CLINIC | Age: 69
Setting detail: OPHTHALMOLOGY
End: 2024-07-12
Payer: MEDICARE

## 2024-07-12 DIAGNOSIS — H43.393: ICD-10-CM

## 2024-07-12 DIAGNOSIS — H17.821: ICD-10-CM

## 2024-07-12 PROCEDURE — 99214 OFFICE O/P EST MOD 30 MIN: CPT | Performed by: OPHTHALMOLOGY

## 2024-07-12 ASSESSMENT — LID POSITION - PTOSIS
OD_PTOSIS: RUL T
OS_PTOSIS: LUL T

## 2024-07-12 ASSESSMENT — CONFRONTATIONAL VISUAL FIELD TEST (CVF)
OS_FINDINGS: FULL
OD_FINDINGS: FULL

## 2024-08-15 ENCOUNTER — ASC (OUTPATIENT)
Dept: URBAN - METROPOLITAN AREA SURGERY 8 | Facility: SURGERY | Age: 69
Setting detail: OPHTHALMOLOGY
End: 2024-08-15
Payer: MEDICARE

## 2024-08-15 DIAGNOSIS — H43.311: ICD-10-CM

## 2024-08-15 PROCEDURE — 67031 LASER SURGERY EYE STRANDS: CPT | Mod: RT | Performed by: OPHTHALMOLOGY

## 2024-08-15 ASSESSMENT — CONFRONTATIONAL VISUAL FIELD TEST (CVF)
OS_FINDINGS: FULL
OD_FINDINGS: FULL

## 2024-09-24 ENCOUNTER — APPOINTMENT (OUTPATIENT)
Dept: NEUROLOGY | Facility: CLINIC | Age: 69
End: 2024-09-24

## 2024-10-17 ENCOUNTER — OFFICE (OUTPATIENT)
Dept: URBAN - METROPOLITAN AREA CLINIC 94 | Facility: CLINIC | Age: 69
Setting detail: OPHTHALMOLOGY
End: 2024-10-17
Payer: MEDICARE

## 2024-10-17 DIAGNOSIS — H35.033: ICD-10-CM

## 2024-10-17 DIAGNOSIS — H16.223: ICD-10-CM

## 2024-10-17 DIAGNOSIS — H17.821: ICD-10-CM

## 2024-10-17 PROCEDURE — 99212 OFFICE O/P EST SF 10 MIN: CPT | Mod: 24 | Performed by: OPHTHALMOLOGY

## 2024-10-17 PROCEDURE — 83861 MICROFLUID ANALY TEARS: CPT | Performed by: OPHTHALMOLOGY

## 2024-10-17 PROCEDURE — 92250 FUNDUS PHOTOGRAPHY W/I&R: CPT | Performed by: OPHTHALMOLOGY

## 2024-10-17 ASSESSMENT — KERATOMETRY
OS_AXISANGLE_DEGREES: 065
OS_K2POWER_DIOPTERS: 43.00
OD_K1POWER_DIOPTERS: 42.50
OS_K1POWER_DIOPTERS: 42.75
OD_AXISANGLE_DEGREES: 120
OD_K2POWER_DIOPTERS: 42.75
METHOD_AUTO_MANUAL: AUTO

## 2024-10-17 ASSESSMENT — REFRACTION_MANIFEST
OD_VA1: 20/25
OS_CYLINDER: SPHERE
OS_ADD: +2.00
OS_SPHERE: +0.50
OD_CYLINDER: -0.25
OD_AXIS: 050
OD_SPHERE: +0.25
OD_VA2: 20/20
OS_CYLINDER: -0.25
OS_VA1: 20/25
OS_SPHERE: +0.25
OD_VA1: 20/25-2
OD_ADD: +2.00
OS_VA1: 20/20
OD_CYLINDER: -1.00
OS_AXIS: 110
OD_AXIS: 075
OS_VA2: 20/20
OD_SPHERE: +0.25
OU_VA: 20/20

## 2024-10-17 ASSESSMENT — REFRACTION_AUTOREFRACTION
OS_CYLINDER: -0.75
OD_CYLINDER: -0.50
OD_SPHERE: +0.50
OS_SPHERE: +0.75
OD_AXIS: 080
OS_AXIS: 110

## 2024-10-17 ASSESSMENT — TEAR BREAK UP TIME (TBUT)
OD_TBUT: T 1+
OS_TBUT: T 1+

## 2024-10-17 ASSESSMENT — SUPERFICIAL PUNCTATE KERATITIS (SPK)
OS_SPK: T
OD_SPK: T

## 2024-10-17 ASSESSMENT — LID POSITION - PTOSIS
OD_PTOSIS: RUL T
OS_PTOSIS: LUL T

## 2024-10-17 ASSESSMENT — TONOMETRY
OD_IOP_MMHG: 14
OS_IOP_MMHG: 15

## 2024-10-17 ASSESSMENT — CONFRONTATIONAL VISUAL FIELD TEST (CVF)
OD_FINDINGS: FULL
OS_FINDINGS: FULL

## 2024-10-17 ASSESSMENT — VISUAL ACUITY
OD_BCVA: 20/20
OS_BCVA: 20/20-2

## 2025-04-02 ENCOUNTER — APPOINTMENT (OUTPATIENT)
Dept: GASTROENTEROLOGY | Facility: CLINIC | Age: 70
End: 2025-04-02
Payer: MEDICARE

## 2025-04-02 VITALS
WEIGHT: 180 LBS | TEMPERATURE: 97.6 F | HEIGHT: 72 IN | BODY MASS INDEX: 24.38 KG/M2 | RESPIRATION RATE: 14 BRPM | SYSTOLIC BLOOD PRESSURE: 145 MMHG | OXYGEN SATURATION: 98 % | HEART RATE: 94 BPM | DIASTOLIC BLOOD PRESSURE: 98 MMHG

## 2025-04-02 DIAGNOSIS — R10.32 LEFT LOWER QUADRANT PAIN: ICD-10-CM

## 2025-04-02 DIAGNOSIS — Z71.9 COUNSELING, UNSPECIFIED: ICD-10-CM

## 2025-04-02 DIAGNOSIS — Z86.0100 PERSONAL HISTORY OF COLON POLYPS, UNSPECIFIED: ICD-10-CM

## 2025-04-02 PROCEDURE — 99213 OFFICE O/P EST LOW 20 MIN: CPT

## 2025-04-02 RX ORDER — SODIUM SULFATE, POTASSIUM SULFATE AND MAGNESIUM SULFATE 1.6; 3.13; 17.5 G/177ML; G/177ML; G/177ML
17.5-3.13-1.6 SOLUTION ORAL
Qty: 1 | Refills: 0 | Status: ACTIVE | COMMUNITY
Start: 2025-04-02 | End: 1900-01-01

## 2025-04-16 ENCOUNTER — OFFICE (OUTPATIENT)
Dept: URBAN - METROPOLITAN AREA CLINIC 94 | Facility: CLINIC | Age: 70
Setting detail: OPHTHALMOLOGY
End: 2025-04-16
Payer: MEDICARE

## 2025-04-16 DIAGNOSIS — H43.311: ICD-10-CM

## 2025-04-16 DIAGNOSIS — H35.033: ICD-10-CM

## 2025-04-16 DIAGNOSIS — H17.821: ICD-10-CM

## 2025-04-16 DIAGNOSIS — Z96.1: ICD-10-CM

## 2025-04-16 PROCEDURE — 92012 INTRM OPH EXAM EST PATIENT: CPT | Performed by: OPHTHALMOLOGY

## 2025-04-16 PROCEDURE — 92250 FUNDUS PHOTOGRAPHY W/I&R: CPT | Performed by: OPHTHALMOLOGY

## 2025-04-16 ASSESSMENT — KERATOMETRY
OS_AXISANGLE_DEGREES: 026
OD_AXISANGLE_DEGREES: 111
METHOD_AUTO_MANUAL: AUTO
OS_K1POWER_DIOPTERS: 42.50
OD_K1POWER_DIOPTERS: 42.25
OS_K2POWER_DIOPTERS: 42.75
OD_K2POWER_DIOPTERS: 42.75

## 2025-04-16 ASSESSMENT — REFRACTION_MANIFEST
OD_AXIS: 075
OD_VA1: 20/25
OD_CYLINDER: -1.00
OS_ADD: +2.00
OU_VA: 20/20
OS_VA1: 20/25
OD_SPHERE: +0.25
OD_AXIS: 050
OD_CYLINDER: -0.25
OD_VA2: 20/20
OD_VA1: 20/25-2
OS_SPHERE: +0.25
OS_CYLINDER: -0.25
OS_VA1: 20/20
OS_CYLINDER: SPHERE
OD_SPHERE: +0.25
OS_AXIS: 110
OD_ADD: +2.00
OS_SPHERE: +0.50
OS_VA2: 20/20

## 2025-04-16 ASSESSMENT — REFRACTION_AUTOREFRACTION
OS_AXIS: 104
OS_SPHERE: +0.50
OD_AXIS: 080
OS_CYLINDER: -0.50
OD_CYLINDER: -0.25
OD_SPHERE: +0.50

## 2025-04-16 ASSESSMENT — VISUAL ACUITY
OD_BCVA: 20/25-1
OS_BCVA: 20/25

## 2025-04-16 ASSESSMENT — SUPERFICIAL PUNCTATE KERATITIS (SPK)
OD_SPK: T
OS_SPK: T

## 2025-04-16 ASSESSMENT — LID POSITION - PTOSIS
OS_PTOSIS: LUL T
OD_PTOSIS: RUL T

## 2025-04-16 ASSESSMENT — TONOMETRY
OD_IOP_MMHG: 14
OS_IOP_MMHG: 15

## 2025-04-16 ASSESSMENT — TEAR BREAK UP TIME (TBUT)
OS_TBUT: T 1+
OD_TBUT: T 1+

## 2025-04-16 ASSESSMENT — CONFRONTATIONAL VISUAL FIELD TEST (CVF)
OS_FINDINGS: FULL
OD_FINDINGS: FULL

## 2025-04-23 ENCOUNTER — OFFICE (OUTPATIENT)
Dept: URBAN - METROPOLITAN AREA CLINIC 94 | Facility: CLINIC | Age: 70
Setting detail: OPHTHALMOLOGY
End: 2025-04-23
Payer: MEDICARE

## 2025-04-23 DIAGNOSIS — H35.033: ICD-10-CM

## 2025-04-23 DIAGNOSIS — H17.821: ICD-10-CM

## 2025-04-23 DIAGNOSIS — H43.312: ICD-10-CM

## 2025-04-23 DIAGNOSIS — H35.373: ICD-10-CM

## 2025-04-23 PROBLEM — H43.311 VITREOUS MEMBRANES AND STRANDS; RIGHT EYE, LEFT EYE: Status: ACTIVE | Noted: 2025-04-16

## 2025-04-23 PROCEDURE — 67031 LASER SURGERY EYE STRANDS: CPT | Mod: LT | Performed by: OPHTHALMOLOGY

## 2025-04-23 PROCEDURE — 92134 CPTRZ OPH DX IMG PST SGM RTA: CPT | Performed by: OPHTHALMOLOGY

## 2025-04-23 PROCEDURE — 99213 OFFICE O/P EST LOW 20 MIN: CPT | Mod: 57 | Performed by: OPHTHALMOLOGY

## 2025-04-23 ASSESSMENT — KERATOMETRY
OS_K2POWER_DIOPTERS: 42.75
METHOD_AUTO_MANUAL: AUTO
OD_K1POWER_DIOPTERS: 42.25
OS_AXISANGLE_DEGREES: 026
OD_K2POWER_DIOPTERS: 42.75
OS_K1POWER_DIOPTERS: 42.50
OD_AXISANGLE_DEGREES: 111

## 2025-04-23 ASSESSMENT — TEAR BREAK UP TIME (TBUT)
OS_TBUT: T 1+
OD_TBUT: T 1+

## 2025-04-23 ASSESSMENT — REFRACTION_MANIFEST
OD_CYLINDER: -0.25
OS_ADD: +2.00
OD_SPHERE: +0.25
OD_CYLINDER: -1.00
OS_SPHERE: +0.50
OS_VA1: 20/25
OS_VA1: 20/20
OD_VA2: 20/20
OD_AXIS: 050
OD_AXIS: 075
OS_VA2: 20/20
OD_SPHERE: +0.25
OD_VA1: 20/25
OS_AXIS: 110
OD_VA1: 20/25-2
OS_SPHERE: +0.25
OS_CYLINDER: -0.25
OS_CYLINDER: SPHERE
OU_VA: 20/20
OD_ADD: +2.00

## 2025-04-23 ASSESSMENT — REFRACTION_AUTOREFRACTION
OS_SPHERE: +0.50
OD_AXIS: 080
OD_SPHERE: +0.50
OS_CYLINDER: -0.50
OD_CYLINDER: -0.25
OS_AXIS: 104

## 2025-04-23 ASSESSMENT — VISUAL ACUITY
OS_BCVA: 20/25
OD_BCVA: 20/25-1

## 2025-04-23 ASSESSMENT — TONOMETRY
OS_IOP_MMHG: 15
OD_IOP_MMHG: 14

## 2025-04-23 ASSESSMENT — LID POSITION - PTOSIS
OD_PTOSIS: RUL T
OS_PTOSIS: LUL T

## 2025-04-23 ASSESSMENT — CONFRONTATIONAL VISUAL FIELD TEST (CVF)
OD_FINDINGS: FULL
OS_FINDINGS: FULL

## 2025-04-23 ASSESSMENT — SUPERFICIAL PUNCTATE KERATITIS (SPK)
OD_SPK: T
OS_SPK: T

## 2025-04-30 NOTE — ASU DISCHARGE PLAN (ADULT/PEDIATRIC) - A. DRIVE A CAR, OPERATE POWER TOOLS OR MACHINERY
Laura from Neurology called patient needs pre-op clearance for June 2 Surgery. I called patient and wife Marylee answered took a message for Se to call us back to schedule appt. Please attach referral to appt.    
Statement Selected

## 2025-05-16 ENCOUNTER — OFFICE (OUTPATIENT)
Dept: URBAN - METROPOLITAN AREA CLINIC 94 | Facility: CLINIC | Age: 70
Setting detail: OPHTHALMOLOGY
End: 2025-05-16
Payer: MEDICARE

## 2025-05-16 DIAGNOSIS — H35.373: ICD-10-CM

## 2025-05-16 DIAGNOSIS — H16.223: ICD-10-CM

## 2025-05-16 DIAGNOSIS — H17.821: ICD-10-CM

## 2025-05-16 DIAGNOSIS — H52.4: ICD-10-CM

## 2025-05-16 PROCEDURE — 83861 MICROFLUID ANALY TEARS: CPT | Performed by: OPHTHALMOLOGY

## 2025-05-16 PROCEDURE — 92014 COMPRE OPH EXAM EST PT 1/>: CPT | Mod: 24 | Performed by: OPHTHALMOLOGY

## 2025-05-16 PROCEDURE — 92015 DETERMINE REFRACTIVE STATE: CPT | Performed by: OPHTHALMOLOGY

## 2025-05-16 ASSESSMENT — REFRACTION_MANIFEST
OS_CYLINDER: -0.25
OD_SPHERE: +0.25
OD_VA1: 20/25
OS_SPHERE: +0.25
OD_CYLINDER: -1.00
OU_VA: 20/20
OD_VA1: 20/25
OS_VA1: 20/25
OD_ADD: +2.50
OS_CYLINDER: -0.25
OD_CYLINDER: -0.25
OS_AXIS: 102
OD_VA1: 20/25-2
OD_ADD: +2.00
OU_VA: 20/25
OS_SPHERE: +0.50
OS_VA2: 20/25
OS_CYLINDER: SPHERE
OS_ADD: +2.50
OD_AXIS: 075
OS_VA1: 20/20
OS_VA1: 20/25
OS_SPHERE: +0.50
OD_AXIS: 050
OD_AXIS: 071
OD_CYLINDER: -0.25
OS_VA2: 20/20
OD_VA2: 20/20
OD_SPHERE: PLANO
OD_SPHERE: +0.25
OD_VA2: 20/25
OS_ADD: +2.00
OS_AXIS: 110

## 2025-05-16 ASSESSMENT — KERATOMETRY
METHOD_AUTO_MANUAL: AUTO
OS_AXISANGLE_DEGREES: 063
OS_K2POWER_DIOPTERS: 43.00
OS_K1POWER_DIOPTERS: 42.75
OD_K2POWER_DIOPTERS: 42.75
OD_K1POWER_DIOPTERS: 42.50
OD_AXISANGLE_DEGREES: 103

## 2025-05-16 ASSESSMENT — VISUAL ACUITY
OD_BCVA: 20/25
OS_BCVA: 20/25

## 2025-05-16 ASSESSMENT — LID POSITION - PTOSIS
OS_PTOSIS: LUL T
OD_PTOSIS: RUL T

## 2025-05-16 ASSESSMENT — TONOMETRY
OD_IOP_MMHG: 13
OS_IOP_MMHG: 14

## 2025-05-16 ASSESSMENT — REFRACTION_AUTOREFRACTION
OD_AXIS: 071
OS_SPHERE: +0.50
OD_SPHERE: +0.50
OS_AXIS: 102
OD_CYLINDER: -0.25
OS_CYLINDER: -0.50

## 2025-05-16 ASSESSMENT — TEAR BREAK UP TIME (TBUT)
OD_TBUT: T 1+
OS_TBUT: T 1+

## 2025-05-16 ASSESSMENT — SUPERFICIAL PUNCTATE KERATITIS (SPK)
OS_SPK: T
OD_SPK: T

## 2025-05-16 ASSESSMENT — CONFRONTATIONAL VISUAL FIELD TEST (CVF)
OD_FINDINGS: FULL
OS_FINDINGS: FULL

## 2025-06-02 ENCOUNTER — RESULT REVIEW (OUTPATIENT)
Age: 70
End: 2025-06-02

## 2025-06-02 ENCOUNTER — OUTPATIENT (OUTPATIENT)
Dept: OUTPATIENT SERVICES | Facility: HOSPITAL | Age: 70
LOS: 1 days | End: 2025-06-02
Payer: MEDICARE

## 2025-06-02 ENCOUNTER — APPOINTMENT (OUTPATIENT)
Dept: GASTROENTEROLOGY | Facility: GI CENTER | Age: 70
End: 2025-06-02
Payer: MEDICARE

## 2025-06-02 ENCOUNTER — TRANSCRIPTION ENCOUNTER (OUTPATIENT)
Age: 70
End: 2025-06-02

## 2025-06-02 DIAGNOSIS — Z86.0100 PERSONAL HISTORY OF COLON POLYPS, UNSPECIFIED: ICD-10-CM

## 2025-06-02 DIAGNOSIS — Z98.890 OTHER SPECIFIED POSTPROCEDURAL STATES: Chronic | ICD-10-CM

## 2025-06-02 DIAGNOSIS — R10.32 LEFT LOWER QUADRANT PAIN: ICD-10-CM

## 2025-06-02 DIAGNOSIS — Z12.11 ENCOUNTER FOR SCREENING FOR MALIGNANT NEOPLASM OF COLON: ICD-10-CM

## 2025-06-02 DIAGNOSIS — K57.80 DIVERTICULITIS OF INTESTINE, PART UNSPECIFIED, WITH PERFORATION AND ABSCESS WITHOUT BLEEDING: ICD-10-CM

## 2025-06-02 PROCEDURE — 88305 TISSUE EXAM BY PATHOLOGIST: CPT | Mod: 26

## 2025-06-02 PROCEDURE — 45385 COLONOSCOPY W/LESION REMOVAL: CPT

## 2025-06-02 PROCEDURE — 88305 TISSUE EXAM BY PATHOLOGIST: CPT

## 2025-06-02 PROCEDURE — 45385 COLONOSCOPY W/LESION REMOVAL: CPT | Mod: PT

## 2025-06-03 LAB — SURGICAL PATHOLOGY STUDY: SIGNIFICANT CHANGE UP

## 2025-08-18 ENCOUNTER — APPOINTMENT (OUTPATIENT)
Dept: GASTROENTEROLOGY | Facility: GI CENTER | Age: 70
End: 2025-08-18

## (undated) DEVICE — SNARE CAPTIVATOR II RND COLD 10MM

## (undated) DEVICE — POLY TRAP ETRAP

## (undated) DEVICE — FORCEP ENDOJAW AGTR LC W NDL 2.8MM 230CM DISP

## (undated) DEVICE — KIT DEFENDO 4 OLY 4 PC

## (undated) DEVICE — CSC-COLONOSCOPE 2002772: Type: DURABLE MEDICAL EQUIPMENT